# Patient Record
Sex: FEMALE | Race: NATIVE HAWAIIAN OR OTHER PACIFIC ISLANDER | Employment: UNEMPLOYED | URBAN - METROPOLITAN AREA
[De-identification: names, ages, dates, MRNs, and addresses within clinical notes are randomized per-mention and may not be internally consistent; named-entity substitution may affect disease eponyms.]

---

## 2017-12-04 ENCOUNTER — OP HISTORICAL/CONVERTED ENCOUNTER (OUTPATIENT)
Dept: OTHER | Age: 42
End: 2017-12-04

## 2020-07-20 PROBLEM — R06.09 DYSPNEA ON EXERTION: Status: ACTIVE | Noted: 2020-07-20

## 2020-07-20 PROBLEM — M54.50 LOW BACK PAIN: Status: ACTIVE | Noted: 2020-07-20

## 2020-07-20 PROBLEM — F31.9 BIPOLAR DISORDER (HCC): Status: ACTIVE | Noted: 2020-07-20

## 2020-07-20 PROBLEM — M35.00 SJOGREN'S DISEASE (HCC): Status: ACTIVE | Noted: 2020-07-20

## 2020-07-20 PROBLEM — I10 ESSENTIAL HYPERTENSION: Status: ACTIVE | Noted: 2020-07-20

## 2020-07-20 PROBLEM — E66.9 OBESITY: Status: ACTIVE | Noted: 2020-07-20

## 2020-07-20 PROBLEM — J45.909 ASTHMA: Status: ACTIVE | Noted: 2020-07-20

## 2020-07-20 PROBLEM — K44.9 HIATAL HERNIA: Status: ACTIVE | Noted: 2020-07-20

## 2020-07-20 PROBLEM — D80.1 COMMON VARIABLE AGAMMAGLOBULINEMIA (HCC): Status: ACTIVE | Noted: 2020-07-20

## 2020-07-20 PROBLEM — K21.9 GASTROESOPHAGEAL REFLUX DISEASE: Status: ACTIVE | Noted: 2020-07-20

## 2020-07-20 PROBLEM — R60.0 EDEMA OF FOOT: Status: ACTIVE | Noted: 2020-07-20

## 2020-07-20 PROBLEM — E78.5 HYPERLIPIDEMIA: Status: ACTIVE | Noted: 2020-07-20

## 2020-07-20 PROBLEM — D72.829 LEUKOCYTOSIS: Status: ACTIVE | Noted: 2020-07-20

## 2020-07-20 RX ORDER — ALPRAZOLAM 1 MG/1
1 TABLET ORAL
COMMUNITY

## 2020-07-20 RX ORDER — TENOFOVIR ALAFENAMIDE 25 MG/1
25 TABLET ORAL DAILY
COMMUNITY

## 2020-07-20 RX ORDER — METOPROLOL TARTRATE 25 MG/1
25 TABLET, FILM COATED ORAL 2 TIMES DAILY
COMMUNITY
End: 2021-10-12 | Stop reason: SDUPTHER

## 2020-07-20 RX ORDER — GABAPENTIN 300 MG/1
300 CAPSULE ORAL 3 TIMES DAILY
COMMUNITY
End: 2020-08-06 | Stop reason: SDUPTHER

## 2020-07-20 RX ORDER — FUROSEMIDE 40 MG/1
40 TABLET ORAL DAILY
COMMUNITY
End: 2021-10-12 | Stop reason: SDUPTHER

## 2020-07-20 RX ORDER — RANOLAZINE 1000 MG/1
TABLET, EXTENDED RELEASE ORAL 2 TIMES DAILY
COMMUNITY
End: 2021-10-12 | Stop reason: SDUPTHER

## 2020-07-20 RX ORDER — FLUTICASONE PROPIONATE 50 MCG
2 SPRAY, SUSPENSION (ML) NASAL DAILY
COMMUNITY
End: 2021-10-12 | Stop reason: SDUPTHER

## 2020-07-20 RX ORDER — FAMOTIDINE 20 MG/1
20 TABLET, FILM COATED ORAL 2 TIMES DAILY
COMMUNITY
End: 2020-08-17 | Stop reason: SDUPTHER

## 2020-07-20 RX ORDER — RISPERIDONE 0.5 MG/1
0.5 TABLET, FILM COATED ORAL 2 TIMES DAILY
COMMUNITY
End: 2022-04-28

## 2020-07-20 RX ORDER — PANTOPRAZOLE SODIUM 40 MG/1
40 TABLET, DELAYED RELEASE ORAL DAILY
COMMUNITY
End: 2020-08-17 | Stop reason: SDUPTHER

## 2020-07-20 RX ORDER — LAMOTRIGINE 25 MG/1
25 TABLET ORAL 2 TIMES DAILY
COMMUNITY

## 2020-07-20 RX ORDER — FOLIC ACID 1 MG/1
TABLET ORAL DAILY
COMMUNITY
End: 2021-10-12 | Stop reason: SDUPTHER

## 2020-07-20 RX ORDER — TRIAZOLAM 0.25 MG/1
0.25 TABLET ORAL
COMMUNITY

## 2020-07-20 RX ORDER — DEXTROAMPHETAMINE SACCHARATE, AMPHETAMINE ASPARTATE, DEXTROAMPHETAMINE SULFATE AND AMPHETAMINE SULFATE 7.5; 7.5; 7.5; 7.5 MG/1; MG/1; MG/1; MG/1
30 TABLET ORAL DAILY
COMMUNITY

## 2020-07-20 RX ORDER — BACLOFEN 10 MG/1
TABLET ORAL 3 TIMES DAILY
COMMUNITY
End: 2021-10-12 | Stop reason: SDUPTHER

## 2020-07-20 RX ORDER — METHOCARBAMOL 500 MG/1
TABLET, FILM COATED ORAL 4 TIMES DAILY
COMMUNITY
End: 2021-10-12 | Stop reason: SDUPTHER

## 2020-07-20 RX ORDER — HUMAN IMMUNOGLOBULIN G 0.2 G/ML
LIQUID SUBCUTANEOUS
COMMUNITY

## 2020-07-20 RX ORDER — MINERAL OIL
180 ENEMA (ML) RECTAL DAILY
COMMUNITY
End: 2021-10-12 | Stop reason: SDUPTHER

## 2020-07-20 RX ORDER — LANOLIN ALCOHOL/MO/W.PET/CERES
CREAM (GRAM) TOPICAL DAILY
COMMUNITY
End: 2021-10-12 | Stop reason: SDUPTHER

## 2020-07-20 RX ORDER — ALBUTEROL SULFATE 2.5 MG/.5ML
SOLUTION RESPIRATORY (INHALATION) ONCE
COMMUNITY

## 2020-07-20 RX ORDER — METHOTREXATE 2.5 MG/1
10 TABLET ORAL
COMMUNITY

## 2020-07-20 RX ORDER — FLUTICASONE PROPIONATE AND SALMETEROL 500; 50 UG/1; UG/1
1 POWDER RESPIRATORY (INHALATION) EVERY 12 HOURS
COMMUNITY
End: 2021-10-12 | Stop reason: SDUPTHER

## 2020-07-20 RX ORDER — MONTELUKAST SODIUM 10 MG/1
10 TABLET ORAL DAILY
COMMUNITY
End: 2021-10-12 | Stop reason: SDUPTHER

## 2020-07-29 ENCOUNTER — TELEPHONE (OUTPATIENT)
Dept: FAMILY MEDICINE CLINIC | Age: 45
End: 2020-07-29

## 2020-07-29 DIAGNOSIS — M54.50 CHRONIC BILATERAL LOW BACK PAIN WITHOUT SCIATICA: Primary | ICD-10-CM

## 2020-07-29 DIAGNOSIS — G89.29 CHRONIC BILATERAL LOW BACK PAIN WITHOUT SCIATICA: Primary | ICD-10-CM

## 2020-08-06 PROBLEM — G89.29 CHRONIC BILATERAL LOW BACK PAIN WITHOUT SCIATICA: Status: ACTIVE | Noted: 2020-07-20

## 2020-08-06 RX ORDER — GABAPENTIN 300 MG/1
600 CAPSULE ORAL 2 TIMES DAILY
Qty: 120 CAP | Refills: 1 | Status: SHIPPED | OUTPATIENT
Start: 2020-08-06 | End: 2021-01-21

## 2020-08-17 ENCOUNTER — VIRTUAL VISIT (OUTPATIENT)
Dept: INTERNAL MEDICINE CLINIC | Age: 45
End: 2020-08-17
Payer: OTHER GOVERNMENT

## 2020-08-17 DIAGNOSIS — M54.50 CHRONIC BILATERAL LOW BACK PAIN WITHOUT SCIATICA: Primary | ICD-10-CM

## 2020-08-17 DIAGNOSIS — M06.9 RHEUMATOID ARTHRITIS, INVOLVING UNSPECIFIED SITE, UNSPECIFIED RHEUMATOID FACTOR PRESENCE: ICD-10-CM

## 2020-08-17 DIAGNOSIS — G89.29 CHRONIC BILATERAL LOW BACK PAIN WITHOUT SCIATICA: Primary | ICD-10-CM

## 2020-08-17 DIAGNOSIS — K21.9 GASTROESOPHAGEAL REFLUX DISEASE WITHOUT ESOPHAGITIS: ICD-10-CM

## 2020-08-17 DIAGNOSIS — E66.8 MODERATE OBESITY: ICD-10-CM

## 2020-08-17 DIAGNOSIS — I10 ESSENTIAL HYPERTENSION: ICD-10-CM

## 2020-08-17 PROCEDURE — 99443 PR PHYS/QHP TELEPHONE EVALUATION 21-30 MIN: CPT | Performed by: INTERNAL MEDICINE

## 2020-08-17 RX ORDER — HYDROXYCHLOROQUINE SULFATE 200 MG/1
200 TABLET, FILM COATED ORAL DAILY
COMMUNITY
End: 2022-04-28 | Stop reason: SDUPTHER

## 2020-08-17 RX ORDER — PANTOPRAZOLE SODIUM 40 MG/1
40 TABLET, DELAYED RELEASE ORAL DAILY
Qty: 90 TAB | Refills: 3 | Status: SHIPPED | OUTPATIENT
Start: 2020-08-17 | End: 2021-10-12 | Stop reason: SDUPTHER

## 2020-08-17 RX ORDER — FAMOTIDINE 20 MG/1
20 TABLET, FILM COATED ORAL 2 TIMES DAILY
Qty: 90 TAB | Refills: 3 | Status: SHIPPED | OUTPATIENT
Start: 2020-08-17 | End: 2020-09-29

## 2020-08-17 NOTE — PROGRESS NOTES
Yolanda Madison presents today for   Chief Complaint   Patient presents with    Follow-up     referrals [facility]        Depression Screening:  3 most recent PHQ Screens 8/17/2020   Little interest or pleasure in doing things Not at all   Feeling down, depressed, irritable, or hopeless Not at all   Total Score PHQ 2 0       Learning Assessment:  Learning Assessment 8/17/2020   PRIMARY LEARNER Patient   HIGHEST LEVEL OF EDUCATION - PRIMARY LEARNER  GRADUATED HIGH SCHOOL OR GED   PRIMARY LANGUAGE ENGLISH   LEARNER PREFERENCE PRIMARY DEMONSTRATION   ANSWERED BY patient   RELATIONSHIP SELF         Health Maintenance reviewed and discussed and ordered per Provider. Health Maintenance Due   Topic Date Due    Pneumococcal 0-64 years (1 of 3 - PCV13) 08/31/1983    DTaP/Tdap/Td series (1 - Tdap) 08/31/1998    PAP AKA CERVICAL CYTOLOGY  08/31/1998    Lipid Screen  08/31/2017    Influenza Age 9 to Adult  08/01/2020   . Coordination of Care:  1. Have you been to the ER, urgent care clinic since your last visit? Hospitalized since your last visit?no    2. Have you seen or consulted any other health care providers outside of the [address] [place] [address] since your last visit? Include any pap smears or colon screening.  No

## 2020-08-17 NOTE — PROGRESS NOTES
I was at my office in [place], [place] while conducting this encounter. The patient is at home. Consent:  Patient and/or HIS/HER healthcare decision maker is aware that this patient-initiated Telehealth encounter is a billable service, with coverage as determined by patient's insurance carrier. Patient is aware that He/She may receive a bill and has provided verbal consent to proceed: Consent has been obtained within past 12 months of the date of this encounter. This virtual visit was conducted via Telephone    1. Chronic bilateral low back pain without sciatica  The patient's old referral to [facility] has evidently . She does not currently have the identifying information for us to fax a referral over with this woman she gets the information and I told her would be happy to send this referral over    2. Gastroesophageal reflux disease without esophagitis  Renew Protonix and Pepcid  - pantoprazole (PROTONIX) 40 mg tablet; Take 1 Tab by mouth daily. Dispense: 90 Tab; Refill: 3  - famotidine (PEPCID) 20 mg tablet; Take 1 Tab by mouth two (2) times a day. Dispense: 90 Tab; Refill: 3    3. Essential hypertension  Patient had labs ordered back in February and she never had them done. I have informed her that before I order any additional medications I will need her to get labs and will order a metabolic panel  - METABOLIC PANEL, BASIC; Future    4. Rheumatoid arthritis, involving unspecified site, unspecified rheumatoid factor presence ([facility] [address]  She is on chronic methotrexate and I am unsure when her last CBC was. We have not checked this  - CBC WITH AUTOMATED DIFF; Future    5. Moderate obesity  He is at high risk for metabolic syndrome we will check a lipid panel and a hemoglobin A1c  - HEMOGLOBIN A1C WITH EAG; Future  - LIPID PANEL;  Future       Chief Complaint   Patient presents with    Follow-up     referrals [facility]         [facility]   This 17-year-old female with multiple medical problems who presents today for refills on her meds. She was last seen in February of this year by Dr. Monty Hardy who at that point addressed approximately 10 medical issues. She had also ordered labs and referrals. Evidently these were not done. The patient reports she has chronic lower back pain due to her sciatica and has been seeing pain management at [facility] but evidently her referral has . And she is asking for a new referral.  She has no chest pain shortness of breath nausea vomiting or diarrhea and and has been urinating without difficulty. Reports her GERD has been controlled with her current drug regimen. Current Outpatient Medications on File Prior to Visit   Medication Sig Dispense Refill    hydrOXYchloroQUINE (Plaquenil) 200 mg tablet Take 200 mg by mouth daily. 2 tabs daily      gabapentin (NEURONTIN) 300 mg capsule Take 2 Caps by mouth two (2) times a day. Max Daily Amount: 1,200 mg. 120 Cap 1    fluticasone propion-salmeteroL (Advair Diskus) 500-50 mcg/dose diskus inhaler Take 1 Puff by inhalation every twelve (12) hours.  albuterol sulfate (PROVENTIL;VENTOLIN) 2.5 mg/0.5 mL nebu nebulizer solution by Nebulization route once.  ALPRAZolam (XANAX) 1 mg tablet Take 1 mg by mouth. 1 and 1/2 tabs daily as needed      mecobalamin (B12 ACTIVE PO) Take  by mouth.  baclofen (LIORESAL) 10 mg tablet Take  by mouth three (3) times daily.  dextroamphetamine-amphetamine (ADDERALL) 30 mg tablet Take 30 mg by mouth.  fexofenadine (ALLEGRA) 180 mg tablet Take  by mouth.  fluticasone propionate (FLONASE) 50 mcg/actuation nasal spray 2 Sprays by Both Nostrils route daily.  folic acid (FOLVITE) 1 mg tablet Take  by mouth daily.  furosemide (LASIX) 40 mg tablet Take  by mouth daily.  immun glob G,IgG,-pro-IgA 0-50 (Hizentra) 10 gram/50 mL (20 %) soln by SubCUTAneous route.  adalimumab (HUMIRA,CF, SC) by SubCUTAneous route.       lamoTRIgine (LaMICtal) 25 mg tablet Take  by mouth daily.      methocarbamoL (ROBAXIN) 500 mg tablet Take  by mouth four (4) times daily.  methotrexate (RHEUMATREX) 2.5 mg tablet Take  by mouth.  metoprolol tartrate (LOPRESSOR) 25 mg tablet Take  by mouth two (2) times a day.  montelukast (SINGULAIR) 10 mg tablet Take 10 mg by mouth daily.  pyridoxine, vitamin B6, (VITAMIN B-6) 50 mg tablet Take  by mouth daily.  ranolazine ER (RANEXA) 1,000 mg Take  by mouth two (2) times a day.  risperiDONE (RisperDAL) 0.5 mg tablet Take  by mouth.  triazolam (HALCION) 0.25 mg tablet Take 0.25 mg by mouth nightly as needed.  tenofovir ALAFENAMIDE FUMARATE (Vemlidy) 25 mg tab Take  by mouth.  [DISCONTINUED] famotidine (PEPCID) 20 mg tablet Take 20 mg by mouth two (2) times a day.  [DISCONTINUED] pantoprazole (PROTONIX) 40 mg tablet Take 40 mg by mouth daily. No current facility-administered medications on file prior to visit.          Past Medical History:   Diagnosis Date    Asthma 7/20/2020    Bipolar disorder ([facility] [address] 7/20/2020    Common variable agammaglobulinemia ([facility] [address] 7/20/2020    Dyspnea on exertion 7/20/2020    Edema of foot 7/20/2020    Essential hypertension 7/20/2020    Gastroesophageal reflux disease 7/20/2020    Hiatal hernia 7/20/2020    Hyperlipidemia 7/20/2020    Leukocytosis 7/20/2020    Low back pain 7/20/2020    Obesity 7/20/2020    Sjogren's disease ([facility] [address] 7/20/2020             Total Time Spent on this Encounter: greater than 21 minutes

## 2020-09-29 DIAGNOSIS — K21.9 GASTROESOPHAGEAL REFLUX DISEASE WITHOUT ESOPHAGITIS: ICD-10-CM

## 2020-09-29 RX ORDER — FAMOTIDINE 20 MG/1
TABLET, FILM COATED ORAL
Qty: 180 TAB | Refills: 3 | Status: SHIPPED | OUTPATIENT
Start: 2020-09-29 | End: 2021-02-02 | Stop reason: SDUPTHER

## 2020-11-03 ENCOUNTER — VIRTUAL VISIT (OUTPATIENT)
Dept: FAMILY MEDICINE CLINIC | Age: 45
End: 2020-11-03

## 2020-11-03 DIAGNOSIS — Z12.31 SCREENING MAMMOGRAM, ENCOUNTER FOR: ICD-10-CM

## 2020-11-03 DIAGNOSIS — M35.9 CONNECTIVE TISSUE DISEASE (HCC): Primary | ICD-10-CM

## 2020-11-03 DIAGNOSIS — K76.9 LIVER DISORDER: ICD-10-CM

## 2020-11-03 DIAGNOSIS — D80.1 COMMON VARIABLE AGAMMAGLOBULINEMIA (HCC): ICD-10-CM

## 2020-11-03 DIAGNOSIS — M54.16 LUMBAR RADICULOPATHY: ICD-10-CM

## 2020-11-03 DIAGNOSIS — D72.820 LYMPHOCYTOSIS: ICD-10-CM

## 2020-11-03 PROCEDURE — 99442 PR PHYS/QHP TELEPHONE EVALUATION 11-20 MIN: CPT | Performed by: NURSE PRACTITIONER

## 2020-11-03 NOTE — PROGRESS NOTES
Kevin Castellon is a 39 y.o. female, evaluated via audio-only technology on 11/3/2020 for Follow Up Chronic Condition; Anxiety; Depression; Cholesterol Problem; and Hypertension  . Assessment & Plan:   Diagnoses and all orders for this visit:    1. Connective tissue disease ([facility] [address]  -     REFERRAL TO RHEUMATOLOGY    2. Common variable agammaglobulinemia ([facility] [address]  -     REFERRAL TO IMMUNOLOGY    3. Lymphocytosis  -     REFERRAL TO HEMATOLOGY    4. Liver disorder  -     REFERRAL TO GASTROENTEROLOGY    5. Lumbar radiculopathy  -     REFERRAL TO SPINE SURGERY    6. Screening mammogram, encounter for  -     [facility] MAMMO BI SCREENING INCL CAD; Future            12  Subjective:   Patient is here today for multiple referrals and follow-up. Patient has a history of connective tissue diease. She is being followed by Rheumatology at [facility]. She carriers a positive marker for Hepatitis C and is being followed by hepatology and GI. She has a history of lymphocytosis and is being followed by hematology/oncology. Today she reports she is doing well, no new complaints. Denies any shortness of breath or chest pains. Prior to Admission medications    Medication Sig Start Date End Date Taking? Authorizing Provider   famotidine (PEPCID) 20 mg tablet TAKE 1 TO 2 TABLETS AT BEDTIME AS NEEDED FOR ACID REFLUX  (5/28/20 NEED TO SET UP APPOINTMENT WITH A NEW PROVIDER FOR ADDITIONAL REFILLS) 9/29/20  Yes Addison Mancilla MD   hydrOXYchloroQUINE (Plaquenil) 200 mg tablet Take 200 mg by mouth daily. 2 tabs daily   Yes Provider, Historical   pantoprazole (PROTONIX) 40 mg tablet Take 1 Tab by mouth daily. 8/17/20  Yes Gillian Ambrosio MD   gabapentin (NEURONTIN) 300 mg capsule Take 2 Caps by mouth two (2) times a day. Max Daily Amount: 1,200 mg. 8/6/20  Yes Addison Mancilla MD   fluticasone propion-salmeteroL (Advair Diskus) 500-50 mcg/dose diskus inhaler Take 1 Puff by inhalation every twelve (12) hours.    Yes Provider, Historical   albuterol sulfate (PROVENTIL;VENTOLIN) 2.5 mg/0.5 mL nebu nebulizer solution by Nebulization route once. Yes Provider, Historical   ALPRAZolam (XANAX) 1 mg tablet Take 1 mg by mouth. 1 and 1/2 tabs daily as needed   Yes Provider, Historical   mecobalamin (B12 ACTIVE PO) Take  by mouth. Yes Provider, Historical   baclofen (LIORESAL) 10 mg tablet Take  by mouth three (3) times daily. Yes Provider, Historical   dextroamphetamine-amphetamine (ADDERALL) 30 mg tablet Take 30 mg by mouth daily. Yes Provider, Historical   fexofenadine (ALLEGRA) 180 mg tablet Take 180 mg by mouth daily. Yes Provider, Historical   fluticasone propionate (FLONASE) 50 mcg/actuation nasal spray 2 Sprays by Both Nostrils route daily. Yes Provider, Historical   folic acid (FOLVITE) 1 mg tablet Take  by mouth daily. Yes Provider, Historical   furosemide (LASIX) 40 mg tablet Take 40 mg by mouth daily. Yes Provider, Historical   immun glob G,IgG,-pro-IgA 0-50 (Hizentra) 10 gram/50 mL (20 %) soln by SubCUTAneous route. Patient takes 10 grams every Monday   Yes Provider, Historical   adalimumab (HUMIRA,CF, SC) by SubCUTAneous route. Yes Provider, Historical   lamoTRIgine (LaMICtal) 25 mg tablet Take 25 mg by mouth two (2) times a day. Yes Provider, Historical   methocarbamoL (ROBAXIN) 500 mg tablet Take  by mouth four (4) times daily. Yes Provider, Historical   methotrexate (RHEUMATREX) 2.5 mg tablet Take 10 mg by mouth every Sunday. Yes Provider, Historical   montelukast (SINGULAIR) 10 mg tablet Take 10 mg by mouth daily. Yes Provider, Historical   pyridoxine, vitamin B6, (VITAMIN B-6) 50 mg tablet Take  by mouth daily. Yes Provider, Historical   ranolazine ER (RANEXA) 1,000 mg Take  by mouth two (2) times a day. Yes Provider, Historical   risperiDONE (RisperDAL) 0.5 mg tablet Take 0.5 mg by mouth two (2) times a day.    Yes Provider, Historical   triazolam (HALCION) 0.25 mg tablet Take 0.25 mg by mouth nightly as needed. Yes Provider, Historical   tenofovir ALAFENAMIDE FUMARATE (Vemlidy) 25 mg tab Take 25 mg by mouth daily. Yes Provider, Historical   metoprolol tartrate (LOPRESSOR) 25 mg tablet Take 25 mg by mouth two (2) times a day. Patient takes 1 and 1/2 once daily    Provider, Historical         Review of Systems   Constitutional: Negative for chills and fever. Respiratory: Negative for cough and shortness of breath. Cardiovascular: Negative for chest pain, palpitations and leg swelling. Genitourinary: Negative. Musculoskeletal: Positive for myalgias. Skin: Negative for rash. Neurological: Negative for dizziness, weakness and headaches. Psychiatric/Behavioral: Negative for depression. The patient is not nervous/anxious. Physical Exam  Vitals signs and nursing note reviewed. Constitutional:       Comments: Physical exam was deferred due to COVID- 19 precautions as visit was completed via telemedicine. No flowsheet data found. Guicho Mullins, who was evaluated through a patient-initiated, synchronous (real-time) audio only encounter, and/or her healthcare decision maker, is aware that it is a billable service, with coverage as determined by her insurance carrier. She provided verbal consent to proceed: Yes. She has not had a related appointment within my department in the past 7 days or scheduled within the next 24 hours.       Total Time: minutes: 11-20 minutes    David De Oliveira NP

## 2020-11-03 NOTE — PROGRESS NOTES
Mark Common presents today for   Chief Complaint   Patient presents with    Follow Up Chronic Condition    Anxiety    Depression    Cholesterol Problem    Hypertension       Depression Screening:  3 most recent PHQ Screens 11/3/2020   Little interest or pleasure in doing things Not at all   Feeling down, depressed, irritable, or hopeless Several days   Total Score PHQ 2 1       Learning Assessment:  Learning Assessment 11/3/2020   PRIMARY LEARNER Patient   HIGHEST LEVEL OF EDUCATION - PRIMARY LEARNER  SOME COLLEGE   BARRIERS PRIMARY LEARNER NONE   CO-LEARNER CAREGIVER No   PRIMARY LANGUAGE ENGLISH   LEARNER PREFERENCE PRIMARY LISTENING   ANSWERED BY Brenda Yoder   RELATIONSHIP SELF       Abuse Screening:  Abuse Screening Questionnaire 11/3/2020   Do you ever feel afraid of your partner? N   Are you in a relationship with someone who physically or mentally threatens you? N   Is it safe for you to go home? Y       Fall Risk  Fall Risk Assessment, last 12 mths 11/3/2020   Able to walk? Yes   Fall in past 12 months? Yes   Fall with injury? No   Number of falls in past 12 months 4   Fall Risk Score 4       ADL  ADL Assessment 11/3/2020   Feeding yourself No Help Needed   Getting from bed to chair No Help Needed   Getting dressed No Help Needed   Bathing or showering No Help Needed   Walk across the room (includes cane/walker) No Help Needed   Using the telphone No Help Needed   Taking your medications No Help Needed   Preparing meals No Help Needed   Managing money (expenses/bills) No Help Needed   Moderately strenuous housework (laundry) No Help Needed   Shopping for personal items (toiletries/medicines) No Help Needed   Shopping for groceries No Help Needed   Driving No Help Needed   Climbing a flight of stairs No Help Needed   Getting to places beyond walking distances No Help Needed       Health Maintenance reviewed and discussed and ordered per Provider.     Health Maintenance Due   Topic Date Due    PAP AKA CERVICAL CYTOLOGY  08/31/1998    Lipid Screen  08/31/2017    Flu Vaccine (1) 09/01/2020   . Coordination of Care:  1. Have you been to the ER, urgent care clinic since your last visit? Hospitalized since your last visit? No/ No     2. Have you seen or consulted any other health care providers outside of the [address] [place] [address] since your last visit? Include any pap smears or colon screening. No     Pap- Patient had complete hysterectomy  Mammogram - 2019 per patient.

## 2020-11-06 ENCOUNTER — HOSPITAL ENCOUNTER (OUTPATIENT)
Dept: MAMMOGRAPHY | Age: 45
Discharge: HOME | End: 2020-11-06
Attending: NURSE PRACTITIONER
Payer: OTHER GOVERNMENT

## 2020-11-06 DIAGNOSIS — Z12.31 SCREENING MAMMOGRAM, ENCOUNTER FOR: ICD-10-CM

## 2020-11-06 PROCEDURE — 77067 SCR MAMMO BI INCL CAD: CPT

## 2020-12-17 ENCOUNTER — OFFICE VISIT (OUTPATIENT)
Dept: FAMILY MEDICINE CLINIC | Age: 45
End: 2020-12-17
Payer: OTHER GOVERNMENT

## 2020-12-17 VITALS
BODY MASS INDEX: 34.4 KG/M2 | TEMPERATURE: 96.7 F | OXYGEN SATURATION: 97 % | HEART RATE: 93 BPM | HEIGHT: 68 IN | WEIGHT: 227 LBS | DIASTOLIC BLOOD PRESSURE: 97 MMHG | SYSTOLIC BLOOD PRESSURE: 136 MMHG

## 2020-12-17 DIAGNOSIS — F17.200 TOBACCO DEPENDENCE: ICD-10-CM

## 2020-12-17 DIAGNOSIS — S41.111D LACERATION OF RIGHT UPPER EXTREMITY, SUBSEQUENT ENCOUNTER: Primary | ICD-10-CM

## 2020-12-17 PROBLEM — S41.111A LACERATION OF RIGHT UPPER EXTREMITY: Status: ACTIVE | Noted: 2020-12-17

## 2020-12-17 PROCEDURE — 99213 OFFICE O/P EST LOW 20 MIN: CPT | Performed by: FAMILY MEDICINE

## 2020-12-17 NOTE — PROGRESS NOTES
Subjective:   Ramya Ojeda is a 39 y.o. female who was seen for Wound Check    HPI patient is a 51-year-old female. She fell on her back door 2 days ago and sustained a huge laceration to the right forearm laterally. They put 14 stitches in it they were told they put stitches underneath as well. They put a significant compression dressing on. Her last tetanus was 4 years ago. No chest congestion or cough no rash no syncope or loss of consciousness. Her bowel movements have been appropriate. She is a chronic smoker. She has bipolar disease hypertension hyperlipidemia as well. Home Medications    Medication Sig Start Date End Date Taking? Authorizing Provider   famotidine (PEPCID) 20 mg tablet TAKE 1 TO 2 TABLETS AT BEDTIME AS NEEDED FOR ACID REFLUX  (5/28/20 NEED TO SET UP APPOINTMENT WITH A NEW PROVIDER FOR ADDITIONAL REFILLS) 9/29/20   Fatou Osman MD   hydrOXYchloroQUINE (Plaquenil) 200 mg tablet Take 200 mg by mouth daily. 2 tabs daily    Provider, Historical   pantoprazole (PROTONIX) 40 mg tablet Take 1 Tab by mouth daily. 8/17/20   Romeo Lopes MD   gabapentin (NEURONTIN) 300 mg capsule Take 2 Caps by mouth two (2) times a day. Max Daily Amount: 1,200 mg. 8/6/20   Fatou Osman MD   fluticasone propion-salmeteroL (Advair Diskus) 500-50 mcg/dose diskus inhaler Take 1 Puff by inhalation every twelve (12) hours. Provider, Historical   albuterol sulfate (PROVENTIL;VENTOLIN) 2.5 mg/0.5 mL nebu nebulizer solution by Nebulization route once. Provider, Historical   ALPRAZolam (XANAX) 1 mg tablet Take 1 mg by mouth. 1 and 1/2 tabs daily as needed    Provider, Historical   mecobalamin (B12 ACTIVE PO) Take  by mouth. Provider, Historical   baclofen (LIORESAL) 10 mg tablet Take  by mouth three (3) times daily. Provider, Historical   dextroamphetamine-amphetamine (ADDERALL) 30 mg tablet Take 30 mg by mouth daily.     Provider, Historical   fexofenadine (ALLEGRA) 180 mg tablet Take 180 mg by mouth daily. Provider, Historical   fluticasone propionate (FLONASE) 50 mcg/actuation nasal spray 2 Sprays by Both Nostrils route daily. Provider, Historical   folic acid (FOLVITE) 1 mg tablet Take  by mouth daily. Provider, Historical   furosemide (LASIX) 40 mg tablet Take 40 mg by mouth daily. Provider, Historical   immun glob G,IgG,-pro-IgA 0-50 (Hizentra) 10 gram/50 mL (20 %) soln by SubCUTAneous route. Patient takes 10 grams every Monday    Provider, Historical   adalimumab (HUMIRA,CF, SC) by SubCUTAneous route. Provider, Historical   lamoTRIgine (LaMICtal) 25 mg tablet Take 25 mg by mouth two (2) times a day. Provider, Historical   methocarbamoL (ROBAXIN) 500 mg tablet Take  by mouth four (4) times daily. Provider, Historical   methotrexate (RHEUMATREX) 2.5 mg tablet Take 10 mg by mouth every Sunday. Provider, Historical   metoprolol tartrate (LOPRESSOR) 25 mg tablet Take 25 mg by mouth two (2) times a day. Patient takes 1 and 1/2 once daily    Provider, Historical   montelukast (SINGULAIR) 10 mg tablet Take 10 mg by mouth daily. Provider, Historical   pyridoxine, vitamin B6, (VITAMIN B-6) 50 mg tablet Take  by mouth daily. Provider, Historical   ranolazine ER (RANEXA) 1,000 mg Take  by mouth two (2) times a day. Provider, Historical   risperiDONE (RisperDAL) 0.5 mg tablet Take 0.5 mg by mouth two (2) times a day. Provider, Historical   triazolam (HALCION) 0.25 mg tablet Take 0.25 mg by mouth nightly as needed. Provider, Historical   tenofovir ALAFENAMIDE FUMARATE (Vemlidy) 25 mg tab Take 25 mg by mouth daily.     Provider, Historical      Allergies   Allergen Reactions    Topiramate Other (comments)     Irregular heart rate      Tramadol Hives    Codeine Nausea Only    Diclofenac Swelling    Wellbutrin [Bupropion Hcl] Hives     Social History     Tobacco Use    Smoking status: Current Every Day Smoker     Packs/day: 0.25     Years: 15.00     Pack years: 3.75    Smokeless tobacco: Never Used   Substance Use Topics    Alcohol use: Not Currently     Frequency: Never    Drug use: Never            Review of Systems   Constitutional: Negative. HENT: Negative. Eyes: Negative. Respiratory: Negative. Cardiovascular: Negative. Gastrointestinal: Negative. Genitourinary: Negative. Musculoskeletal: Negative. Allergic/Immunologic: Negative. Neurological: Negative. Hematological: Negative. Psychiatric/Behavioral: Negative. Physical Exam   Objective:     Visit Vitals  BP (!) 136/97 (BP 1 Location: Left arm, BP Patient Position: Sitting)   Pulse 93   Temp (!) 96.7 °F (35.9 °C) (Temporal)   Ht 5' 8\" (1.727 m)   Wt 227 lb (103 kg)   SpO2 97%   BMI 34.52 kg/m²      General: alert, cooperative, no distress   Mental  status: normal mood, behavior, speech, dress, motor activity, and thought processes, able to follow commands   HENT: NCAT   Neck: no visualized mass   Resp: no respiratory distress   Neuro: no gross deficits   Skin: no discoloration or lesions of concern on visible areas   Psychiatric: normal affect, consistent with stated mood, no evidence of hallucinations   . Blood pressure is 130/90. She has a 6 or 7 cm laceration of the right forearm laterally it is healing well there is minimal hematoma associated with it there really no clinical signs of infection or redness. She is pleasant and cooperative. No significant distress. The lungs are clear the abdomen is benign she smells of tobacco smoke.     Assessment & Plan:     Laceration forearm, tobacco dependence, hypertension: Is insistent that her blood pressures are appropriate at home she is quite anxious today I have asked her to check her blood pressures daily we had a long discussion about tobacco dependence and use and she has not decided yet to stop she says she has an immune disorder and I have told her smoking probably worsens that I remove the bandage I cleaned the area and looked quite good I do not think we need to see her back I redressed it and told her she could follow-up in about 9 more days and we will take her sutures out she will call us if any other issues arise. Think she needs antibiotics. She is going to check her blood pressure at least 1-2 times a day and write that down. I have asked her to bring her blood pressure cuff with her. We had a significantly long discussion about tobacco dependence and the need for her to discontinue ASAP        712    Additional exam findings: We discussed the expected course, resolution and complications of the diagnosis(es) in detail. Medication risks, benefits, costs, interactions, and alternatives were discussed as indicated. I advised her to contact the office if her condition worsens, changes or fails to improve as anticipated. She expressed understanding with the diagnosis(es) and plan.

## 2020-12-17 NOTE — PROGRESS NOTES
Dee Larson presents today for   Chief Complaint   Patient presents with    Wound Check       Is someone accompanying this pt? no    Is the patient using any DME equipment during OV? no    Depression Screening:  3 most recent PHQ Screens 11/3/2020   Little interest or pleasure in doing things Not at all   Feeling down, depressed, irritable, or hopeless Several days   Total Score PHQ 2 1       Learning Assessment:  Learning Assessment 12/17/2020   PRIMARY LEARNER Patient   HIGHEST LEVEL OF EDUCATION - PRIMARY LEARNER  -   BARRIERS PRIMARY LEARNER -   CO-LEARNER CAREGIVER -   PRIMARY LANGUAGE ENGLISH   LEARNER PREFERENCE PRIMARY DEMONSTRATION   ANSWERED BY patient   RELATIONSHIP SELF       Health Maintenance reviewed and discussed and ordered per Provider. Health Maintenance Due   Topic Date Due    Pneumococcal 0-64 years (1 of 3 - PCV13) 08/31/1983    PAP AKA CERVICAL CYTOLOGY  08/31/1998    Lipid Screen  08/31/2017   . Coordination of Care:  1. Have you been to the ER, urgent care clinic since your last visit? Hospitalized since your last visit? Yes vidant for a fall and wound to right arm    2. Have you seen or consulted any other health care providers outside of the [address] [place] [address] since your last visit? Include any pap smears or colon screening.  Yes hospital in   [place], [place]    Providers orders his own labs, orders for colonoscopy, mammograms and referrals as needed    Last UDS Checked no  Last Pain contract signed: no

## 2020-12-28 ENCOUNTER — OFFICE VISIT (OUTPATIENT)
Dept: FAMILY MEDICINE CLINIC | Age: 45
End: 2020-12-28
Payer: OTHER GOVERNMENT

## 2020-12-28 VITALS
BODY MASS INDEX: 33.04 KG/M2 | SYSTOLIC BLOOD PRESSURE: 125 MMHG | TEMPERATURE: 97.8 F | HEART RATE: 83 BPM | HEIGHT: 68 IN | DIASTOLIC BLOOD PRESSURE: 86 MMHG | WEIGHT: 218 LBS | OXYGEN SATURATION: 97 %

## 2020-12-28 DIAGNOSIS — F17.200 TOBACCO DEPENDENCE: ICD-10-CM

## 2020-12-28 DIAGNOSIS — S41.111D LACERATION OF RIGHT UPPER EXTREMITY, SUBSEQUENT ENCOUNTER: ICD-10-CM

## 2020-12-28 DIAGNOSIS — J45.20 MILD INTERMITTENT ASTHMA, UNSPECIFIED WHETHER COMPLICATED: Primary | ICD-10-CM

## 2020-12-28 PROCEDURE — 99213 OFFICE O/P EST LOW 20 MIN: CPT | Performed by: FAMILY MEDICINE

## 2020-12-28 NOTE — PROGRESS NOTES
Addison Maddox presents today for   Chief Complaint   Patient presents with    Follow-up       Is someone accompanying this pt? no    Is the patient using any DME equipment during OV? no    Depression Screening:  3 most recent PHQ Screens 11/3/2020   Little interest or pleasure in doing things Not at all   Feeling down, depressed, irritable, or hopeless Several days   Total Score PHQ 2 1       Learning Assessment:  Learning Assessment 12/17/2020   PRIMARY LEARNER Patient   HIGHEST LEVEL OF EDUCATION - PRIMARY LEARNER  -   BARRIERS PRIMARY LEARNER -   CO-LEARNER CAREGIVER -   PRIMARY LANGUAGE ENGLISH   LEARNER PREFERENCE PRIMARY DEMONSTRATION   ANSWERED BY patient   RELATIONSHIP SELF       Health Maintenance reviewed and discussed and ordered per Provider. Health Maintenance Due   Topic Date Due    Pneumococcal 0-64 years (1 of 3 - PCV13) 08/31/1983    PAP AKA CERVICAL CYTOLOGY  08/31/1998    Lipid Screen  08/31/2017   . Coordination of Care:  1. Have you been to the ER, urgent care clinic since your last visit? Hospitalized since your last visit? no    2. Have you seen or consulted any other health care providers outside of the [address] [place] [address] since your last visit? Include any pap smears or colon screening.  no    Providers orders his own labs, orders for colonoscopy, mammograms and referrals as needed    Last UDS Checked no  Last Pain contract signed: no

## 2020-12-28 NOTE — PROGRESS NOTES
Subjective:   Yashira Santiago is a 39 y.o. female who was seen for Follow-up    HPI the patient is a 79-year-old female who had a significant wound to the right arm from a fall she was on antibiotic therapy and we asked to follow the wound up today. She is still smoking. She has a multitude of medical problems she has bipolar disease. She has obesity she has a history of asthma in spite of smoking she was having some congestion her last visit I felt that we need to follow that up as well we did not give her any steroids. She does use an inhaler and we had that refilled. No rash no syncope or loss of consciousness. Bowel movements have been appropriate nobody at home is been sick thinks the wound is looking better at feeling a little itchy around the edges    Home Medications    Medication Sig Start Date End Date Taking? Authorizing Provider   famotidine (PEPCID) 20 mg tablet TAKE 1 TO 2 TABLETS AT BEDTIME AS NEEDED FOR ACID REFLUX  (5/28/20 NEED TO SET UP APPOINTMENT WITH A NEW PROVIDER FOR ADDITIONAL REFILLS) 9/29/20  Yes Wilfredo Felix MD   hydrOXYchloroQUINE (Plaquenil) 200 mg tablet Take 200 mg by mouth daily. 2 tabs daily   Yes Provider, Historical   pantoprazole (PROTONIX) 40 mg tablet Take 1 Tab by mouth daily. 8/17/20  Yes María Mckinnon MD   gabapentin (NEURONTIN) 300 mg capsule Take 2 Caps by mouth two (2) times a day. Max Daily Amount: 1,200 mg. 8/6/20  Yes Wilfredo Felix MD   fluticasone propion-salmeteroL (Advair Diskus) 500-50 mcg/dose diskus inhaler Take 1 Puff by inhalation every twelve (12) hours. Yes Provider, Historical   mecobalamin (B12 ACTIVE PO) Take  by mouth. Yes Provider, Historical   fexofenadine (ALLEGRA) 180 mg tablet Take 180 mg by mouth daily. Yes Provider, Historical   fluticasone propionate (FLONASE) 50 mcg/actuation nasal spray 2 Sprays by Both Nostrils route daily. Yes Provider, Historical   folic acid (FOLVITE) 1 mg tablet Take  by mouth daily.    Yes Provider, Historical   furosemide (LASIX) 40 mg tablet Take 40 mg by mouth daily. Yes Provider, Historical   immun glob G,IgG,-pro-IgA 0-50 (Hizentra) 10 gram/50 mL (20 %) soln by SubCUTAneous route. Patient takes 10 grams every Monday   Yes Provider, Historical   lamoTRIgine (LaMICtal) 25 mg tablet Take 25 mg by mouth two (2) times a day. Yes Provider, Historical   methocarbamoL (ROBAXIN) 500 mg tablet Take  by mouth four (4) times daily. Yes Provider, Historical   methotrexate (RHEUMATREX) 2.5 mg tablet Take 10 mg by mouth every Sunday. Yes Provider, Historical   metoprolol tartrate (LOPRESSOR) 25 mg tablet Take 25 mg by mouth two (2) times a day. Patient takes 1 and 1/2 once daily   Yes Provider, Historical   montelukast (SINGULAIR) 10 mg tablet Take 10 mg by mouth daily. Yes Provider, Historical   pyridoxine, vitamin B6, (VITAMIN B-6) 50 mg tablet Take  by mouth daily. Yes Provider, Historical   ranolazine ER (RANEXA) 1,000 mg Take  by mouth two (2) times a day. Yes Provider, Historical   risperiDONE (RisperDAL) 0.5 mg tablet Take 0.5 mg by mouth two (2) times a day. Yes Provider, Historical   triazolam (HALCION) 0.25 mg tablet Take 0.25 mg by mouth nightly as needed. Yes Provider, Historical   tenofovir ALAFENAMIDE FUMARATE (Vemlidy) 25 mg tab Take 25 mg by mouth daily. Yes Provider, Historical   albuterol sulfate (PROVENTIL;VENTOLIN) 2.5 mg/0.5 mL nebu nebulizer solution by Nebulization route once. Provider, Historical   ALPRAZolam (XANAX) 1 mg tablet Take 1 mg by mouth. 1 and 1/2 tabs daily as needed    Provider, Historical   baclofen (LIORESAL) 10 mg tablet Take  by mouth three (3) times daily. Provider, Historical   dextroamphetamine-amphetamine (ADDERALL) 30 mg tablet Take 30 mg by mouth daily. Provider, Historical   adalimumab (HUMIRA,CF, SC) by SubCUTAneous route.     Provider, Historical      Allergies   Allergen Reactions    Topiramate Other (comments)     Irregular heart rate      Tramadol Hives    Codeine Nausea Only    Diclofenac Swelling    Wellbutrin [Bupropion Hcl] Hives     Social History     Tobacco Use    Smoking status: Current Every Day Smoker     Packs/day: 0.25     Years: 15.00     Pack years: 3.75    Smokeless tobacco: Never Used   Substance Use Topics    Alcohol use: Not Currently     Frequency: Never    Drug use: Never        Patient Active Problem List   Diagnosis Code    Asthma J45.909    [facility] F31.9    Obesity E66.9    Common variable 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 HENT: Negative. Eyes: Negative. Respiratory: Negative. Gastrointestinal: Negative. Endocrine: Negative. Genitourinary: Negative. Allergic/Immunologic: Negative. Neurological: Negative. Hematological: Negative. Psychiatric/Behavioral: Negative.          Physical Exam   Objective:     Visit Vitals  /86 (BP 1 Location: Left arm, BP Patient Position: Sitting)   Pulse 83   Temp 97.8 °F (36.6 °C)   Ht 5' 8\" (1.727 m)   Wt 218 lb (98.9 kg)   SpO2 97%   BMI 33.15 kg/m²      General: alert, cooperative, no distress   Mental  status: normal mood, behavior, speech, dress, motor activity, and thought processes, able to follow commands   HENT: NCAT   Neck: no visualized mass   Resp: no respiratory distress   Neuro: no gross deficits   Skin: no discoloration or lesions of concern on visible areas   Psychiatric: normal affect, consistent with stated mood, no evidence of hallucinations   Laceration on her arm seems relatively well healed. The lungs are clear no asthma no wheezing is appreciated. She is oriented x3 she smells of tobacco products. Assessment & Plan:     Asthma tobacco dependence laceration forearm: The sutures were removed. There were no complications Steri-Strips were applied I again asked her to stop all smoking products she again told me she would not be doing that anytime soon she is going to complete the antibiotic therapy. She will follow up on an as-needed basis. She is followed up by psychiatry for bipolar disease and from pulmonary for her asthma. I spent at least 23 minutes on this visit with this established patient. [phone]    Additional exam findings: We discussed the expected course, resolution and complications of the diagnosis(es) in detail. Medication risks, benefits, costs, interactions, and alternatives were discussed as indicated. I advised her to contact the office if her condition worsens, changes or fails to improve as anticipated. She expressed understanding with the diagnosis(es) and plan.

## 2021-01-21 DIAGNOSIS — G89.29 CHRONIC BILATERAL LOW BACK PAIN WITHOUT SCIATICA: ICD-10-CM

## 2021-01-21 DIAGNOSIS — M54.50 CHRONIC BILATERAL LOW BACK PAIN WITHOUT SCIATICA: ICD-10-CM

## 2021-01-21 RX ORDER — GABAPENTIN 300 MG/1
CAPSULE ORAL
Qty: 120 CAP | Refills: 1 | Status: SHIPPED | OUTPATIENT
Start: 2021-01-21 | End: 2021-03-28

## 2021-02-02 ENCOUNTER — VIRTUAL VISIT (OUTPATIENT)
Dept: FAMILY MEDICINE CLINIC | Age: 46
End: 2021-02-02
Payer: OTHER GOVERNMENT

## 2021-02-02 DIAGNOSIS — R07.89 ATYPICAL CHEST PAIN: ICD-10-CM

## 2021-02-02 DIAGNOSIS — K21.9 GASTROESOPHAGEAL REFLUX DISEASE WITHOUT ESOPHAGITIS: ICD-10-CM

## 2021-02-02 DIAGNOSIS — E78.2 MIXED HYPERLIPIDEMIA: ICD-10-CM

## 2021-02-02 DIAGNOSIS — E78.2 MIXED HYPERLIPIDEMIA: Primary | ICD-10-CM

## 2021-02-02 PROCEDURE — 99442 PR PHYS/QHP TELEPHONE EVALUATION 11-20 MIN: CPT | Performed by: NURSE PRACTITIONER

## 2021-02-02 RX ORDER — FAMOTIDINE 20 MG/1
TABLET, FILM COATED ORAL
Qty: 180 TAB | Refills: 1 | Status: SHIPPED | OUTPATIENT
Start: 2021-02-02 | End: 2021-07-16 | Stop reason: SDUPTHER

## 2021-02-02 NOTE — PROGRESS NOTES
Karin Canavan is a 39 y.o. female, evaluated via audio-only technology on 2/2/2021 for Follow Up Chronic Condition (3 month f/u )  . Assessment & Plan:   Diagnoses and all orders for this visit:    1. Mixed hyperlipidemia  -Checking status  -     LIPID PANEL; Future    2. Gastroesophageal reflux disease without esophagitis  -     famotidine (PEPCID) 20 mg tablet; TAKE 1 TO 2 TABLETS AT BEDTIME AS NEEDED FOR ACID REFLUX  Indications: gastroesophageal reflux disease    3. Atypical chest pain  -     REFERRAL TO CARDIOLOGY            12  Subjective:   Patient is here today for request of referral renewals. Patient is being followed by cardiology and needs a new referral for atypical chest pains, denies having pain at this time. She request a refill of famotidine 20 mg BID, symptoms are controlled with medication use. She has no complaints today. Denies any shortness of breath or chest pains. No lightheadedness or dizziness. Prior to Admission medications    Medication Sig Start Date End Date Taking? Authorizing Provider   gabapentin (NEURONTIN) 300 mg capsule TAKE 2 CAPSULES TWICE A DAY. MAX DAILY AMOUNT OF 1200 MG 1/21/21  Yes Hollie Perkins MD   famotidine (PEPCID) 20 mg tablet TAKE 1 TO 2 TABLETS AT BEDTIME AS NEEDED FOR ACID REFLUX  (5/28/20 NEED TO SET UP APPOINTMENT WITH A NEW PROVIDER FOR ADDITIONAL REFILLS) 9/29/20  Yes Hollie Perkins MD   hydrOXYchloroQUINE (Plaquenil) 200 mg tablet Take 200 mg by mouth daily. 2 tabs daily   Yes Provider, Historical   pantoprazole (PROTONIX) 40 mg tablet Take 1 Tab by mouth daily. 8/17/20  Yes Rene Boyd MD   fluticasone propion-salmeteroL (Advair Diskus) 500-50 mcg/dose diskus inhaler Take 1 Puff by inhalation every twelve (12) hours. Yes Provider, Historical   albuterol sulfate (PROVENTIL;VENTOLIN) 2.5 mg/0.5 mL nebu nebulizer solution by Nebulization route once. Yes Provider, Historical   ALPRAZolam (XANAX) 1 mg tablet Take 1 mg by mouth.  1 and 1/2 tabs daily as needed   Yes Provider, Historical   mecobalamin (B12 ACTIVE PO) Take  by mouth. Yes Provider, Historical   baclofen (LIORESAL) 10 mg tablet Take  by mouth three (3) times daily. Yes Provider, Historical   dextroamphetamine-amphetamine (ADDERALL) 30 mg tablet Take 30 mg by mouth daily. Yes Provider, Historical   fexofenadine (ALLEGRA) 180 mg tablet Take 180 mg by mouth daily. Yes Provider, Historical   fluticasone propionate (FLONASE) 50 mcg/actuation nasal spray 2 Sprays by Both Nostrils route daily. Yes Provider, Historical   folic acid (FOLVITE) 1 mg tablet Take  by mouth daily. Yes Provider, Historical   furosemide (LASIX) 40 mg tablet Take 40 mg by mouth daily. Yes Provider, Historical   immun glob G,IgG,-pro-IgA 0-50 (Hizentra) 10 gram/50 mL (20 %) soln by SubCUTAneous route. Patient takes 10 grams every Monday   Yes Provider, Historical   adalimumab (HUMIRA,CF, SC) by SubCUTAneous route. Yes Provider, Historical   lamoTRIgine (LaMICtal) 25 mg tablet Take 25 mg by mouth two (2) times a day. Yes Provider, Historical   methocarbamoL (ROBAXIN) 500 mg tablet Take  by mouth four (4) times daily. Yes Provider, Historical   methotrexate (RHEUMATREX) 2.5 mg tablet Take 10 mg by mouth every Sunday. Yes Provider, Historical   metoprolol tartrate (LOPRESSOR) 25 mg tablet Take 25 mg by mouth two (2) times a day. Patient takes 1 and 1/2 once daily   Yes Provider, Historical   montelukast (SINGULAIR) 10 mg tablet Take 10 mg by mouth daily. Yes Provider, Historical   pyridoxine, vitamin B6, (VITAMIN B-6) 50 mg tablet Take  by mouth daily. Yes Provider, Historical   ranolazine ER (RANEXA) 1,000 mg Take  by mouth two (2) times a day. Yes Provider, Historical   risperiDONE (RisperDAL) 0.5 mg tablet Take 0.5 mg by mouth two (2) times a day. Yes Provider, Historical   triazolam (HALCION) 0.25 mg tablet Take 0.25 mg by mouth nightly as needed.    Yes Provider, Historical   tenofovir ALAFENAMIDE FUMARATE (Vemlidy) 25 mg tab Take 25 mg by mouth daily. Yes Provider, Historical         Review of Systems   Constitutional: Negative for chills and fever. Respiratory: Negative for cough and shortness of breath. Cardiovascular: Negative for chest pain, palpitations and leg swelling. Genitourinary: Negative. Musculoskeletal: Positive for myalgias. Skin: Negative for rash. Neurological: Negative for dizziness, weakness and headaches. Psychiatric/Behavioral: Negative for depression. The patient is not nervous/anxious. Physical Exam  Vitals signs and nursing note reviewed. Constitutional:       Comments: Physical exam was deferred due to COVID- 19 precautions as visit was completed via telemedicine. No flowsheet data found. Anabela Childress, who was evaluated through a patient-initiated, synchronous (real-time) audio only encounter, and/or her healthcare decision maker, is aware that it is a billable service, with coverage as determined by her insurance carrier. She provided verbal consent to proceed: Yes. She has not had a related appointment within my department in the past 7 days or scheduled within the next 24 hours.       Total Time: minutes: 11-20 minutes    Lazara Alford NP

## 2021-02-02 NOTE — PROGRESS NOTES
Jack Lyons presents today for   Chief Complaint   Patient presents with    Follow Up Chronic Condition     3 month f/u        Depression Screening:  3 most recent PHQ Screens 2/2/2021   Little interest or pleasure in doing things Not at all   Feeling down, depressed, irritable, or hopeless Not at all   Total Score PHQ 2 0       Learning Assessment:  Learning Assessment 12/17/2020   PRIMARY LEARNER Patient   HIGHEST LEVEL OF EDUCATION - PRIMARY LEARNER  -   BARRIERS PRIMARY LEARNER -   CO-LEARNER CAREGIVER -   PRIMARY LANGUAGE ENGLISH   LEARNER PREFERENCE PRIMARY DEMONSTRATION   ANSWERED BY patient   RELATIONSHIP SELF       Abuse Screening:  Abuse Screening Questionnaire 11/3/2020   Do you ever feel afraid of your partner? N   Are you in a relationship with someone who physically or mentally threatens you? N   Is it safe for you to go home? Y       Fall Risk  Fall Risk Assessment, last 12 mths 11/3/2020   Able to walk? Yes   Fall in past 12 months? Yes   Number of falls in past 12 months 4   Fall with injury? 0       ADL  ADL Assessment 11/3/2020   Feeding yourself No Help Needed   Getting from bed to chair No Help Needed   Getting dressed No Help Needed   Bathing or showering No Help Needed   Walk across the room (includes cane/walker) No Help Needed   Using the telphone No Help Needed   Taking your medications No Help Needed   Preparing meals No Help Needed   Managing money (expenses/bills) No Help Needed   Moderately strenuous housework (laundry) No Help Needed   Shopping for personal items (toiletries/medicines) No Help Needed   Shopping for groceries No Help Needed   Driving No Help Needed   Climbing a flight of stairs No Help Needed   Getting to places beyond walking distances No Help Needed       Health Maintenance reviewed and discussed and ordered per Provider.     Health Maintenance Due   Topic Date Due    Pneumococcal 0-64 years (1 of 3 - PCV13) 08/31/1983    PAP AKA CERVICAL CYTOLOGY  08/31/1998  Lipid Screen  08/31/2017   . Coordination of Care:  1. Have you been to the ER, urgent care clinic since your last visit? Hospitalized since your last visit? Italia LEAVITT, Christy- patient fell down stairs x December 2020    2. Have you seen or consulted any other health care providers outside of the [address] [place] [address] [place] since your last visit? Include any pap smears or colon screening.  [facility]- spine specialist x January 2021

## 2021-03-02 ENCOUNTER — OFFICE VISIT (OUTPATIENT)
Dept: SURGERY | Age: 46
End: 2021-03-02
Payer: OTHER GOVERNMENT

## 2021-03-02 VITALS
TEMPERATURE: 98 F | OXYGEN SATURATION: 100 % | HEART RATE: 74 BPM | RESPIRATION RATE: 18 BRPM | DIASTOLIC BLOOD PRESSURE: 80 MMHG | BODY MASS INDEX: 36.26 KG/M2 | SYSTOLIC BLOOD PRESSURE: 126 MMHG | HEIGHT: 67 IN | WEIGHT: 231 LBS

## 2021-03-02 DIAGNOSIS — E66.01 MORBID OBESITY (HCC): Primary | ICD-10-CM

## 2021-03-02 PROCEDURE — 99205 OFFICE O/P NEW HI 60 MIN: CPT | Performed by: SURGERY

## 2021-03-02 RX ORDER — MELOXICAM 7.5 MG/1
7.5 TABLET ORAL DAILY
COMMUNITY
End: 2022-04-28

## 2021-03-02 NOTE — PROGRESS NOTES
Consult    Patient: Fatou Valdez MRN: [id number]  SSN: [id number]    YOB: 1975  Age: 39 y.o. Sex: female      Initial  Consultation for Bariatric Surgery     Brenda Bonner is a 72-year-old white female who presents for discussion surgical options available for definitive management of her clinically severe obesity. Onset obesity: Age 41 weight 190 pounds and 5 feet 7 inches frame  Weight at age 27: 112 pounds on a 5 feet 7 inches frame  Maximum weight: 240 pounds occurring in January 2021  Pattern/progression of weight gain: Slowly progressive interrupted by dietary weight loss followed by regain of the lost weight as well as additional weight thus exhibiting the yoyo effect after max weight of 240 pounds occurring in January 2021 Max medical weight loss attempts: Multiple supervised and and supervised weight loss trials of maximal loss occurring in 2020 losing 15 pounds over 3 months  Comorbidities: Hypertension, untreated hypercholesterolemia, gastroesophageal reflux disease, reactive airway disease requiring steroids approximately 4 times yearly, clinical obstructive sleep apnea, venous stasis, related arthropathyhips, knees  Current weight 231 body mass index: 36  Ideal body weight: 40  Excess body weight: 91  Postsurgical weight loss: 73  Postsurgical goal weight: 158  Allergies: Topiramate, tramadol, codeine, diclofenac, Wellbutrin  Current medications: See medication list  Past medical history:  1. Clinically severe obesity with body mass index of 36 with obesity and comorbidities hypertension, untreated hypercholesterolemia, Gastrosoft reflux disease, reactive airway disease requiring steroids approximately 4 times yearly, clinical struct of sleep apnea, venous stasis, and weight related arthropathyhips, knees  2 history of nephrolithiasis  3. Lupus  4. Fibromyalgia  5. Bipolar disease  6. Allergic rhinitis  7. Anxiety/depression  8. ADHD  9. Sjogren's syndrome  10. Rheumatoid arthritis  11. Questionable Prinzmetal angina  12. Common variable immunodeficiency  Past surgical history  1. Tonsil/adenoidectomychildhood  2. Bilateral tympanostomieschildhood  3. Placement of ureteral stents after transcystic retrieval of nephrolithiasis  4. ALBERTA/BSO, appendectomy   Social history  Tobacco1 pack daily x15 years  Alcohol: None  Family history: Mother 76stage IV breast carcinoma  Father  56coronary disease  Sister 50 if clinically severe obesity, adult-onset diabetes mellitus    Allergies   Allergen Reactions    Topiramate Other (comments)     Irregular heart rate      Tramadol Hives    Codeine Nausea Only    Diclofenac Swelling    Wellbutrin [Bupropion Hcl] Hives       Current Outpatient Medications on File Prior to Visit   Medication Sig Dispense Refill    meloxicam (MOBIC) 7.5 mg tablet Take 7.5 mg by mouth daily. 1-2 tabs daily as needed foor pain      famotidine (PEPCID) 20 mg tablet TAKE 1 TO 2 TABLETS AT BEDTIME AS NEEDED FOR ACID REFLUX  Indications: gastroesophageal reflux disease 180 Tab 1    gabapentin (NEURONTIN) 300 mg capsule TAKE 2 CAPSULES TWICE A DAY. MAX DAILY AMOUNT OF 1200  Cap 1    hydrOXYchloroQUINE (Plaquenil) 200 mg tablet Take 200 mg by mouth daily. 2 tabs daily      pantoprazole (PROTONIX) 40 mg tablet Take 1 Tab by mouth daily. 90 Tab 3    fluticasone propion-salmeteroL (Advair Diskus) 500-50 mcg/dose diskus inhaler Take 1 Puff by inhalation every twelve (12) hours.  albuterol sulfate (PROVENTIL;VENTOLIN) 2.5 mg/0.5 mL nebu nebulizer solution by Nebulization route once.  ALPRAZolam (XANAX) 1 mg tablet Take 1 mg by mouth. 1 and 1/2 tabs daily as needed      mecobalamin (B12 ACTIVE PO) Take  by mouth.  baclofen (LIORESAL) 10 mg tablet Take  by mouth three (3) times daily.  dextroamphetamine-amphetamine (ADDERALL) 30 mg tablet Take 30 mg by mouth daily.       fexofenadine (ALLEGRA) 180 mg tablet Take 180 mg by mouth daily.  fluticasone propionate (FLONASE) 50 mcg/actuation nasal spray 2 Sprays by Both Nostrils route daily.  folic acid (FOLVITE) 1 mg tablet Take  by mouth daily.  furosemide (LASIX) 40 mg tablet Take 40 mg by mouth daily.  immun glob G,IgG,-pro-IgA 0-50 (Hizentra) 10 gram/50 mL (20 %) soln by SubCUTAneous route. Patient takes 10 grams every Monday      adalimumab (HUMIRA,CF, SC) by SubCUTAneous route.  lamoTRIgine (LaMICtal) 25 mg tablet Take 25 mg by mouth two (2) times a day.  methocarbamoL (ROBAXIN) 500 mg tablet Take  by mouth four (4) times daily.  methotrexate (RHEUMATREX) 2.5 mg tablet Take 10 mg by mouth every Sunday.  metoprolol tartrate (LOPRESSOR) 25 mg tablet Take 25 mg by mouth two (2) times a day. Patient takes 1 and 1/2 once daily      montelukast (SINGULAIR) 10 mg tablet Take 10 mg by mouth daily.  pyridoxine, vitamin B6, (VITAMIN B-6) 50 mg tablet Take  by mouth daily.  ranolazine ER (RANEXA) 1,000 mg Take  by mouth two (2) times a day.  risperiDONE (RisperDAL) 0.5 mg tablet Take 0.5 mg by mouth two (2) times a day.  triazolam (HALCION) 0.25 mg tablet Take 0.25 mg by mouth nightly as needed.  tenofovir ALAFENAMIDE FUMARATE (Vemlidy) 25 mg tab Take 25 mg by mouth daily. No current facility-administered medications on file prior to visit.         Past Medical History:   Diagnosis Date    Asthma 7/20/2020    Bipolar disorder ([facility] 7/20/2020    Common variable agammaglobulinemia ([facility] 7/20/2020    Connective tissue disease ([facility]     CVID (common variable immunodeficiency) (HCC)     Dyspnea on exertion 7/20/2020    Edema of foot 7/20/2020    Endocrine disorder     Essential hypertension 7/20/2020    Gastroesophageal reflux disease 7/20/2020    GERD (gastroesophageal reflux disease)     Hiatal hernia 7/20/2020    History of hormone replacement therapy     took it off and on from 2004 till around 2015  Hyperlipidemia 7/20/2020    Leukocytosis 7/20/2020    Low back pain 7/20/2020    Obesity 7/20/2020    Sjogren's disease ([address] 7/20/2020    RA       Past Surgical History:   Procedure Laterality Date    HX APPENDECTOMY      HX HYSTERECTOMY  2004    complete    HX OOPHORECTOMY      HX TONSIL AND ADENOIDECTOMY         Social History     Tobacco Use    Smoking status: Current Every Day Smoker     Packs/day: 0.50     Years: 15.00     Pack years: 7.50    Smokeless tobacco: Never Used   Substance Use Topics    Alcohol use: Not Currently     Frequency: Never    Drug use: Never       Family History   Problem Relation Age of Onset    Hypertension Mother     Thyroid Disease Mother     Heart Disease Mother     Cancer Mother         breast cancer    Breast Cancer Mother     Heart Disease Father         13 heart attacks    Hypertension Father     Breast Cancer Maternal Grandmother     Breast Cancer Paternal Grandmother     Breast Cancer Paternal Aunt          Review of Systems:      General: Denies fevers, chills, night sweats, fatigue, weight loss, or weight gain.     HEENT: Denies changes in auditory or visual acuity, recurrent pharyngitis, epistaxis, chronic rhinorrhea, vertigo    Respiratory: Denies increasing shortness of breath, productive cough, hemoptysis    Cardiac: Denies known history of cardiac disease, heart murmur, palpitations    GI: Denies dysphagia, recurrent emesis, hematemesis, changes in bowel habits, hematochezia, melena    : Denies hematuria frequency urgency dysuria    Musculoskeletal: Denies fractures, dislocations    Neurologic: Denies history of CVA, paralysis paresthesias, recurrent cephalgia, seizures    Endocrine: Denies polyuria, polydipsia, polyphagia, heat and cold intolerance    Lymph/heme: Denies a history of malignancy, anemia, bruising, blood transfusions    Integumentary: Negative for dermatitis         Physical Exam    Visit Vitals  /80   Pulse 74   Temp 98 °F (36.7 °C)   Resp 18   Ht 5' 7\" (1.702 m)   Wt 104.8 kg (231 lb)   SpO2 100%   BMI 36.18 kg/m²       Nursing note reviewed. General: Clinically severely obese in no acute distress, nontoxic in appearance. Head: Normocephalic, atraumatic  Mouth: Clear, no overt lesions, oral mucosa is pink and moist.  Neck: Supple, no masses, no adenopathy or carotid bruits, trachea midline  Resp: Clear to auscultation bilaterally, no wheezing, rhonchi, or rales, excursions normal and symmetrical.  Cardio: Regular rate and rhythm, no murmurs, clicks, gallops, or rubs. Abdomen: Obese, soft, nontender, nondistended, normoactive bowel sounds, no hernias. Extremities: Warm, well perfused, no tenderness or swelling, normal gait/station, without edema or varicosities  Neuro: Sensation and strength grossly intact and symmetrical.  Psych: Alert and oriented to person, place, and time. Impression/Plan:       42-year-old white female with body mass index of 36 with obesity related comorbidities of hypertension, untreated hypercholesterolemia, Gastrosoft reflux disease, reactive airway disease requiring steroids approximately 4 times yearly, clinical obstructive sleep apnea, venous stasis, levator arthropathyhips, knees who would benefit from bariatric surgery. We have had an extensive discussion with regard to the risks, benefits and likely outcomes of the operation. We've discussed the restrictive and malabsorptive nature of the gastric bypass and compared and contrasted with the sleeve gastrectomy. The patient understands the likelihood of losing approximately 80% of their excess weight in 12 to 18 months. The patient also understands the risks including but not limited to bleeding, infection, need for reoperation, ulcers, leaks and strictures, bowel obstruction secondary to adhesions and internal hernias, DVT, PE, heart attack, stroke, and death.  Patient also understands risks of inadequate weight loss, excess weight loss, vitamin insufficiency, protein malnutrition, excess skin, and loss of hair. We have reviewed the components of a successful postoperative course including requirement for a high protein, low carbohydrate diet, 60 oz a day of zero calorie liquids, daily vitamin supplementation, daily exercise, regular follow-up, and participation in support groups.  At this time we will enroll the patient in our bariatric program, undertake routine laboratory evaluation, chest X-ray, EKG, possible UGI and evaluation by  nutritionist as well as psychologist and pending their satisfactory completion of the preop evaluation, plan to pursue laparoscopic potentially open sleeve gastrectomy as her best choice for bariatric surgery given her need for demonstrate dilatation for asthma treatment to achieve definitive durable weight loss on a personal level with expected resolution of obesity related comorbidities

## 2021-03-17 ENCOUNTER — DOCUMENTATION ONLY (OUTPATIENT)
Dept: BARIATRICS/WEIGHT MGMT | Age: 46
End: 2021-03-17

## 2021-03-17 NOTE — PROGRESS NOTES
3/17/21:  Patient did not show for her nutrition class. This is class 1 of 4. She did not complete any of the nutrition requirements leading up to the appointment. She was left a voicemail to reschedule.     Sruthi Hilario MS RD

## 2021-03-27 DIAGNOSIS — G89.29 CHRONIC BILATERAL LOW BACK PAIN WITHOUT SCIATICA: ICD-10-CM

## 2021-03-27 DIAGNOSIS — M51.9 LUMBAR DISC DISEASE: Primary | ICD-10-CM

## 2021-03-27 DIAGNOSIS — M54.50 CHRONIC BILATERAL LOW BACK PAIN WITHOUT SCIATICA: ICD-10-CM

## 2021-03-28 ENCOUNTER — VIRTUAL VISIT (OUTPATIENT)
Dept: FAMILY MEDICINE CLINIC | Age: 46
End: 2021-03-28
Payer: OTHER GOVERNMENT

## 2021-03-28 DIAGNOSIS — F17.200 TOBACCO DEPENDENCE: ICD-10-CM

## 2021-03-28 DIAGNOSIS — M51.9 LUMBAR DISC DISEASE: ICD-10-CM

## 2021-03-28 DIAGNOSIS — J45.20 MILD INTERMITTENT ASTHMA, UNSPECIFIED WHETHER COMPLICATED: Primary | ICD-10-CM

## 2021-03-28 DIAGNOSIS — I10 ESSENTIAL HYPERTENSION: ICD-10-CM

## 2021-03-28 PROCEDURE — 99442 PR PHYS/QHP TELEPHONE EVALUATION 11-20 MIN: CPT | Performed by: FAMILY MEDICINE

## 2021-03-28 RX ORDER — GABAPENTIN 300 MG/1
CAPSULE ORAL
Qty: 120 CAP | Refills: 2 | Status: SHIPPED | OUTPATIENT
Start: 2021-03-28 | End: 2021-07-15

## 2021-03-29 PROBLEM — M51.9 LUMBAR DISC DISEASE: Status: ACTIVE | Noted: 2021-03-29

## 2021-03-29 NOTE — PROGRESS NOTES
Gail Mckenzie is a 39 y.o. female, evaluated via audio-only technology on 3/28/2021 for No chief complaint on file. .    Assessment & Plan: Chronic back pain on Neurontin asthma, tobacco dependence, rheumatoid disease: The patient is seen with a telephone to telephone visit it was 15 minutes in nature. We needed to refill her Neurontin. She has been using that for several years. She has degenerative disc disease in the lumbar spine status post an ablation 2 years ago she has fatty deposits in her spine she uses Robaxin daily and meloxicam as well she is followed by her low back doctor. She tells me she has a history of fibromyalgia and she is followed at [facility] in the spinal center. She has had occasional injections in her trigger sites as well. We have refilled her medication. I have allowed her to ventilate regarding issues in her life. We had a significant discussion about tobacco cessation. She needs to be aggressive about that when she and says she is trying this was a 15-minute visit with face-to-face communication the patient was at her home I was in my office. 12  Subjective: Patient is a 47-year-old female with significant lumbar disc disease. She has had no nausea vomiting or diarrhea no rash no syncope or loss of consciousness. Her bowel movements have been appropriate no chest pain or shortness of breath she has been eating relatively well. Nobody at home has been sick she has chronic back pain needs Neurontin refilled she is followed by the [facility]. She tells me she has fibromyalgia she is still smoking at least a half a pack a day she uses Robaxin daily some meloxicam as well she has had no chest pain or shortness of breath she has some chronic anxiety with depression. Her bowel movements have been appropriate. No rash no syncope no loss of consciousness. Prior to Admission medications    Medication Sig Start Date End Date Taking?  Authorizing Provider gabapentin (NEURONTIN) 300 mg capsule TAKE 2 CAPSULES TWICE A DAY, MAXIMUM DAILY AMOUNT OF 1200 MG 3/28/21   Lila Palomares MD   meloxicam (MOBIC) 7.5 mg tablet Take 7.5 mg by mouth daily. 1-2 tabs daily as needed foor pain    Provider, Historical   famotidine (PEPCID) 20 mg tablet TAKE 1 TO 2 TABLETS AT BEDTIME AS NEEDED FOR ACID REFLUX  Indications: gastroesophageal reflux disease 2/2/21   Phill Szymanski NP   hydrOXYchloroQUINE (Plaquenil) 200 mg tablet Take 200 mg by mouth daily. 2 tabs daily    Provider, Historical   pantoprazole (PROTONIX) 40 mg tablet Take 1 Tab by mouth daily. 8/17/20   Yisel Castellanos MD   fluticasone propion-salmeteroL (Advair Diskus) 500-50 mcg/dose diskus inhaler Take 1 Puff by inhalation every twelve (12) hours. Provider, Historical   albuterol sulfate (PROVENTIL;VENTOLIN) 2.5 mg/0.5 mL nebu nebulizer solution by Nebulization route once. Provider, Historical   ALPRAZolam (XANAX) 1 mg tablet Take 1 mg by mouth. 1 and 1/2 tabs daily as needed    Provider, Historical   mecobalamin (B12 ACTIVE PO) Take  by mouth. Provider, Historical   baclofen (LIORESAL) 10 mg tablet Take  by mouth three (3) times daily. Provider, Historical   dextroamphetamine-amphetamine (ADDERALL) 30 mg tablet Take 30 mg by mouth daily. Provider, Historical   fexofenadine (ALLEGRA) 180 mg tablet Take 180 mg by mouth daily. Provider, Historical   fluticasone propionate (FLONASE) 50 mcg/actuation nasal spray 2 Sprays by Both Nostrils route daily. Provider, Historical   folic acid (FOLVITE) 1 mg tablet Take  by mouth daily. Provider, Historical   furosemide (LASIX) 40 mg tablet Take 40 mg by mouth daily. Provider, Historical   immun glob G,IgG,-pro-IgA 0-50 (Hizentra) 10 gram/50 mL (20 %) soln by SubCUTAneous route. Patient takes 10 grams every Monday    Provider, Historical   adalimumab (HUMIRA,CF, SC) by SubCUTAneous route.     Provider, Historical   lamoTRIgine (LaMICtal) 25 mg tablet Take 25 mg by mouth two (2) times a day. Provider, Historical   methocarbamoL (ROBAXIN) 500 mg tablet Take  by mouth four (4) times daily. Provider, Historical   methotrexate (RHEUMATREX) 2.5 mg tablet Take 10 mg by mouth every Sunday. Provider, Historical   metoprolol tartrate (LOPRESSOR) 25 mg tablet Take 25 mg by mouth two (2) times a day. Patient takes 1 and 1/2 once daily    Provider, Historical   montelukast (SINGULAIR) 10 mg tablet Take 10 mg by mouth daily. Provider, Historical   pyridoxine, vitamin B6, (VITAMIN B-6) 50 mg tablet Take  by mouth daily. Provider, Historical   ranolazine ER (RANEXA) 1,000 mg Take  by mouth two (2) times a day. Provider, Historical   risperiDONE (RisperDAL) 0.5 mg tablet Take 0.5 mg by mouth two (2) times a day. Provider, Historical   triazolam (HALCION) 0.25 mg tablet Take 0.25 mg by mouth nightly as needed. Provider, Historical   tenofovir ALAFENAMIDE FUMARATE (Vemlidy) 25 mg tab Take 25 mg by mouth daily. Provider, Historical     Patient Active Problem List   Diagnosis Code    Asthma J45.909    Bipolar disorder ([facility] F31.9    Obesity E66.9    Common variable agammaglobulinemia ([facility] D80.1    Dyspnea on exertion R06.00    Edema of foot R60.0    Essential hypertension I10    Gastroesophageal reflux disease K21.9    Hiatal hernia K44.9    Hyperlipidemia E78.5    Leukocytosis D72.829    Chronic bilateral low back pain without sciatica M54.5, G89.29    Sjogren's disease ([facility] M35.00    Laceration of right upper extremity S41.111A    Tobacco dependence F17.200    Lumbar disc disease M51.9       Review of Systems   Constitutional: Negative. HENT: Negative. Eyes: Negative. Respiratory: Negative. Cardiovascular: Negative. Gastrointestinal: Negative. Genitourinary: Negative. Musculoskeletal: Positive for back pain. Skin: Negative. Neurological: Negative. Endo/Heme/Allergies: Negative. Psychiatric/Behavioral: Positive for depression. No flowsheet data found. Marika Ramirez, who was evaluated through a patient-initiated, synchronous (real-time) audio only encounter, and/or her healthcare decision maker, is aware that it is a billable service, with coverage as determined by her insurance carrier. She provided verbal consent to proceed: n/a- consent obtained within past 12 months. She has not had a related appointment within my department in the past 7 days or scheduled within the next 24 hours.       Total Time: minutes: 11-20 minutes    Ariadne Guerra MD

## 2021-07-14 DIAGNOSIS — G89.29 CHRONIC BILATERAL LOW BACK PAIN WITHOUT SCIATICA: ICD-10-CM

## 2021-07-14 DIAGNOSIS — M54.50 CHRONIC BILATERAL LOW BACK PAIN WITHOUT SCIATICA: ICD-10-CM

## 2021-07-15 RX ORDER — GABAPENTIN 300 MG/1
CAPSULE ORAL
Qty: 120 CAPSULE | Refills: 2 | Status: SHIPPED | OUTPATIENT
Start: 2021-07-15 | End: 2021-10-12 | Stop reason: SDUPTHER

## 2021-10-03 DIAGNOSIS — G89.29 CHRONIC BILATERAL LOW BACK PAIN WITHOUT SCIATICA: ICD-10-CM

## 2021-10-03 DIAGNOSIS — M54.50 CHRONIC BILATERAL LOW BACK PAIN WITHOUT SCIATICA: ICD-10-CM

## 2021-10-04 RX ORDER — GABAPENTIN 300 MG/1
CAPSULE ORAL
Qty: 120 CAPSULE | Refills: 2 | OUTPATIENT
Start: 2021-10-04

## 2021-10-12 ENCOUNTER — OFFICE VISIT (OUTPATIENT)
Dept: FAMILY MEDICINE CLINIC | Age: 46
End: 2021-10-12
Payer: OTHER GOVERNMENT

## 2021-10-12 VITALS
HEART RATE: 75 BPM | BODY MASS INDEX: 36.65 KG/M2 | OXYGEN SATURATION: 100 % | WEIGHT: 234 LBS | SYSTOLIC BLOOD PRESSURE: 137 MMHG | DIASTOLIC BLOOD PRESSURE: 99 MMHG

## 2021-10-12 DIAGNOSIS — K21.9 GASTROESOPHAGEAL REFLUX DISEASE WITHOUT ESOPHAGITIS: Primary | ICD-10-CM

## 2021-10-12 DIAGNOSIS — J30.9 ALLERGIC RHINITIS, UNSPECIFIED SEASONALITY, UNSPECIFIED TRIGGER: ICD-10-CM

## 2021-10-12 DIAGNOSIS — E78.2 MIXED HYPERLIPIDEMIA: ICD-10-CM

## 2021-10-12 DIAGNOSIS — G89.29 CHRONIC BILATERAL LOW BACK PAIN WITHOUT SCIATICA: ICD-10-CM

## 2021-10-12 DIAGNOSIS — I10 ESSENTIAL HYPERTENSION: ICD-10-CM

## 2021-10-12 DIAGNOSIS — Z00.00 PREVENTATIVE HEALTH CARE: ICD-10-CM

## 2021-10-12 DIAGNOSIS — M54.50 CHRONIC BILATERAL LOW BACK PAIN WITHOUT SCIATICA: ICD-10-CM

## 2021-10-12 PROCEDURE — 99214 OFFICE O/P EST MOD 30 MIN: CPT | Performed by: STUDENT IN AN ORGANIZED HEALTH CARE EDUCATION/TRAINING PROGRAM

## 2021-10-12 PROCEDURE — 99396 PREV VISIT EST AGE 40-64: CPT | Performed by: STUDENT IN AN ORGANIZED HEALTH CARE EDUCATION/TRAINING PROGRAM

## 2021-10-12 RX ORDER — FLUTICASONE PROPIONATE 50 MCG
2 SPRAY, SUSPENSION (ML) NASAL DAILY
Qty: 1 EACH | Refills: 2 | Status: SHIPPED | OUTPATIENT
Start: 2021-10-12 | End: 2022-04-28 | Stop reason: SDUPTHER

## 2021-10-12 RX ORDER — FUROSEMIDE 40 MG/1
40 TABLET ORAL DAILY
Qty: 90 TABLET | Refills: 1 | Status: SHIPPED | OUTPATIENT
Start: 2021-10-12 | End: 2022-04-28 | Stop reason: SDUPTHER

## 2021-10-12 RX ORDER — METHOTREXATE 2.5 MG/1
10 TABLET ORAL
Status: CANCELLED | OUTPATIENT
Start: 2021-10-17

## 2021-10-12 RX ORDER — FLUTICASONE PROPIONATE AND SALMETEROL 500; 50 UG/1; UG/1
1 POWDER RESPIRATORY (INHALATION) EVERY 12 HOURS
Qty: 60 EACH | Refills: 2 | Status: SHIPPED | OUTPATIENT
Start: 2021-10-12 | End: 2022-04-28 | Stop reason: SDUPTHER

## 2021-10-12 RX ORDER — DEXTROAMPHETAMINE SACCHARATE, AMPHETAMINE ASPARTATE, DEXTROAMPHETAMINE SULFATE AND AMPHETAMINE SULFATE 7.5; 7.5; 7.5; 7.5 MG/1; MG/1; MG/1; MG/1
30 TABLET ORAL DAILY
Status: CANCELLED | OUTPATIENT
Start: 2021-10-12

## 2021-10-12 RX ORDER — TRIAZOLAM 0.25 MG/1
0.25 TABLET ORAL
Status: CANCELLED | OUTPATIENT
Start: 2021-10-12

## 2021-10-12 RX ORDER — NALTREXONE HYDROCHLORIDE 50 MG/1
50 TABLET, FILM COATED ORAL DAILY
COMMUNITY

## 2021-10-12 RX ORDER — LANOLIN ALCOHOL/MO/W.PET/CERES
50 CREAM (GRAM) TOPICAL DAILY
Qty: 90 TABLET | Refills: 2 | Status: SHIPPED | OUTPATIENT
Start: 2021-10-12 | End: 2022-04-28 | Stop reason: SDUPTHER

## 2021-10-12 RX ORDER — BACLOFEN 10 MG/1
10 TABLET ORAL 3 TIMES DAILY
Qty: 90 TABLET | Refills: 2 | Status: SHIPPED | OUTPATIENT
Start: 2021-10-12 | End: 2022-04-28 | Stop reason: SDUPTHER

## 2021-10-12 RX ORDER — TENOFOVIR ALAFENAMIDE 25 MG/1
25 TABLET ORAL DAILY
Status: CANCELLED | OUTPATIENT
Start: 2021-10-12

## 2021-10-12 RX ORDER — HYDROXYCHLOROQUINE SULFATE 200 MG/1
200 TABLET, FILM COATED ORAL DAILY
Status: CANCELLED | OUTPATIENT
Start: 2021-10-12

## 2021-10-12 RX ORDER — ALPRAZOLAM 1 MG/1
1 TABLET ORAL
Status: CANCELLED | OUTPATIENT
Start: 2021-10-12

## 2021-10-12 RX ORDER — PANTOPRAZOLE SODIUM 40 MG/1
40 TABLET, DELAYED RELEASE ORAL DAILY
Qty: 90 TABLET | Refills: 3 | Status: SHIPPED | OUTPATIENT
Start: 2021-10-12 | End: 2022-04-28

## 2021-10-12 RX ORDER — FAMOTIDINE 20 MG/1
TABLET, FILM COATED ORAL
Qty: 180 TABLET | Refills: 1 | Status: SHIPPED | OUTPATIENT
Start: 2021-10-12 | End: 2022-04-28 | Stop reason: SDUPTHER

## 2021-10-12 RX ORDER — MONTELUKAST SODIUM 10 MG/1
10 TABLET ORAL DAILY
Qty: 90 TABLET | Refills: 2 | Status: SHIPPED | OUTPATIENT
Start: 2021-10-12 | End: 2022-04-28 | Stop reason: SDUPTHER

## 2021-10-12 RX ORDER — MECOBALAMIN 1000 MCG
1 TABLET,CHEWABLE ORAL DAILY
Qty: 90 EACH | Refills: 2 | Status: SHIPPED | OUTPATIENT
Start: 2021-10-12 | End: 2022-04-28 | Stop reason: SDUPTHER

## 2021-10-12 RX ORDER — ALBUTEROL SULFATE 2.5 MG/.5ML
SOLUTION RESPIRATORY (INHALATION) ONCE
Status: CANCELLED | OUTPATIENT
Start: 2021-10-12 | End: 2021-10-12

## 2021-10-12 RX ORDER — OXYMETAZOLINE HCL 0.05 %
2 SPRAY, NON-AEROSOL (ML) NASAL 2 TIMES DAILY
Qty: 1 EACH | Refills: 0 | Status: SHIPPED | OUTPATIENT
Start: 2021-10-12 | End: 2021-10-15

## 2021-10-12 RX ORDER — METHOCARBAMOL 500 MG/1
500 TABLET, FILM COATED ORAL 4 TIMES DAILY
Qty: 180 TABLET | Refills: 1 | Status: SHIPPED | OUTPATIENT
Start: 2021-10-12 | End: 2022-04-28 | Stop reason: SDUPTHER

## 2021-10-12 RX ORDER — FOLIC ACID 1 MG/1
1 TABLET ORAL DAILY
Qty: 90 TABLET | Refills: 2 | Status: SHIPPED | OUTPATIENT
Start: 2021-10-12 | End: 2022-04-28 | Stop reason: SDUPTHER

## 2021-10-12 RX ORDER — METOPROLOL TARTRATE 25 MG/1
25 TABLET, FILM COATED ORAL 2 TIMES DAILY
Qty: 90 TABLET | Refills: 3 | Status: SHIPPED | OUTPATIENT
Start: 2021-10-12 | End: 2022-04-28

## 2021-10-12 RX ORDER — NALTREXONE HYDROCHLORIDE 50 MG/1
50 TABLET, FILM COATED ORAL DAILY
Status: CANCELLED | OUTPATIENT
Start: 2021-10-12

## 2021-10-12 RX ORDER — MINERAL OIL
180 ENEMA (ML) RECTAL DAILY
Qty: 90 TABLET | Refills: 2 | Status: SHIPPED | OUTPATIENT
Start: 2021-10-12 | End: 2022-04-28 | Stop reason: SDUPTHER

## 2021-10-12 RX ORDER — LAMOTRIGINE 25 MG/1
25 TABLET ORAL 2 TIMES DAILY
Status: CANCELLED | OUTPATIENT
Start: 2021-10-12

## 2021-10-12 RX ORDER — GABAPENTIN 300 MG/1
CAPSULE ORAL
Qty: 120 CAPSULE | Refills: 2 | Status: SHIPPED | OUTPATIENT
Start: 2021-10-12 | End: 2022-04-28 | Stop reason: SDUPTHER

## 2021-10-12 RX ORDER — RANOLAZINE 1000 MG/1
1000 TABLET, EXTENDED RELEASE ORAL 2 TIMES DAILY
Qty: 90 TABLET | Refills: 3 | Status: SHIPPED | OUTPATIENT
Start: 2021-10-12 | End: 2022-04-28 | Stop reason: SDUPTHER

## 2021-10-12 NOTE — PROGRESS NOTES
Marii Billings presents today for   Chief Complaint   Patient presents with   [facility] Establish Care     establish care with provider was a Mariam patient     Ear Pain     says both ears are draining down to her troat has been happening now for about 1 1/5 weeks        Is someone accompanying this pt? no    Is the patient using any DME equipment during OV? yes    Depression Screening:  3 most recent PHQ Screens 2/2/2021   Little interest or pleasure in doing things Not at all   Feeling down, depressed, irritable, or hopeless Not at all   Total Score PHQ 2 0       Learning Assessment:  Learning Assessment 12/17/2020   PRIMARY LEARNER Patient   HIGHEST LEVEL OF EDUCATION - PRIMARY LEARNER  -   BARRIERS PRIMARY LEARNER -   CO-LEARNER CAREGIVER -   PRIMARY LANGUAGE ENGLISH   LEARNER PREFERENCE PRIMARY DEMONSTRATION   ANSWERED BY patient   RELATIONSHIP SELF       Fall Risk  Fall Risk Assessment, last 12 mths 3/2/2021   Able to walk? Yes   Fall in past 12 months? 1   Do you feel unsteady? 1   Number of falls in past 12 months -   Fall with injury? -       ADL  ADL Assessment 11/3/2020   Feeding yourself No Help Needed   Getting from bed to chair No Help Needed   Getting dressed No Help Needed   Bathing or showering No Help Needed   Walk across the room (includes cane/walker) No Help Needed   Using the telphone No Help Needed   Taking your medications No Help Needed   Preparing meals No Help Needed   Managing money (expenses/bills) No Help Needed   Moderately strenuous housework (laundry) No Help Needed   Shopping for personal items (toiletries/medicines) No Help Needed   Shopping for groceries No Help Needed   Driving No Help Needed   Climbing a flight of stairs No Help Needed   Getting to places beyond walking distances No Help Needed       Travel Screening:    Travel Screening     Question   Response    In the last month, have you been in contact with someone who was confirmed or suspected to have Coronavirus / COVID-19? No / Unsure    Have you had a COVID-19 viral test in the last 14 days? No    Do you have any of the following new or worsening symptoms? None of these    Have you traveled internationally or domestically in the last month? Yes      Travel History   Travel since 09/12/21     No documented travel since 09/12/21          Health Maintenance reviewed and discussed and ordered per Provider. Health Maintenance Due   Topic Date Due    Hepatitis C Screening  Never done    Pneumococcal 0-64 years (1 of 4 - PCV13) Never done    COVID-19 Vaccine (1) Never done    Lipid Screen  Never done    Flu Vaccine (1) 09/01/2021    Breast Cancer Screen Mammogram  11/06/2021   . Coordination of Care:  1. \"Have you been to the ER, urgent care clinic since your last visit? Hospitalized since your last visit? \" No    2. \"Have you seen or consulted any other health care providers outside of the [address] [place] [address] since your last visit? \" Yes When: 10/12/2021     3. For patients aged 39-70: Has the patient had a colonoscopy? Yes, HM satisfied with blue hyperlink     If the patient is female:    4. For patients aged 41-77: Has the patient had a mammogram within the past 2 years? Yes, HM satisfied with blue hyperlink    5. For patients aged 21-65: Has the patient had a pap smear?  Yes, HM satisfied with blue hyperlink

## 2021-10-14 NOTE — PROGRESS NOTES
Subjective:   Jackson Smith is a 42 y.o. female who was seen for annual physical Establish Care (establish care with provider was a [facility] patient ) and Ear Pain (says both ears are draining down to her troat has been happening now for about 1 1/5 weeks )    She has been having worsening congestion, sinus pain and ear clogging for the last week. She has uncontrolled allergies and has been taking her allergy medicines as prescribed. Denies any fevers. Otherwise she has no other complaints. Doing well on all of her medications. Home Medications    Medication Sig Start Date End Date Taking? Authorizing Provider   naltrexone (DEPADE) 50 mg tablet Take 50 mg by mouth daily. Take 4.5 mg daily   Yes Provider, Historical   hydrOXYchloroQUINE (Plaquenil) 200 mg tablet Take 200 mg by mouth daily. 2 tabs daily   Yes Provider, Historical   albuterol sulfate (PROVENTIL;VENTOLIN) 2.5 mg/0.5 mL nebu nebulizer solution by Nebulization route once. Yes Provider, Historical   ALPRAZolam (XANAX) 1 mg tablet Take 1 mg by mouth. 1 and 1/2 tabs daily as needed   Yes Provider, Historical   dextroamphetamine-amphetamine (ADDERALL) 30 mg tablet Take 30 mg by mouth daily. Yes Provider, Historical   immun glob G,IgG,-pro-IgA 0-50 (Hizentra) 10 gram/50 mL (20 %) soln by SubCUTAneous route. Patient takes 10 grams every Monday   Yes Provider, Historical   adalimumab (HUMIRA,CF, SC) by SubCUTAneous route. Yes Provider, Historical   lamoTRIgine (LaMICtal) 25 mg tablet Take 25 mg by mouth two (2) times a day. Yes Provider, Historical   methotrexate (RHEUMATREX) 2.5 mg tablet Take 10 mg by mouth every Sunday. Yes Provider, Historical   triazolam (HALCION) 0.25 mg tablet Take 0.25 mg by mouth nightly as needed. Yes Provider, Historical   tenofovir ALAFENAMIDE FUMARATE (Vemlidy) 25 mg tab Take 25 mg by mouth daily. Yes Provider, Historical   folic acid (FOLVITE) 1 mg tablet Take 1 Tablet by mouth daily.  10/12/21  Yes Jacques Guerrero MD [facility]   pyridoxine, vitamin B6, (VITAMIN B-6) 50 mg tablet Take 1 Tablet by mouth daily. 10/12/21  Yes Daniela Rivera MD   furosemide (LASIX) 40 mg tablet Take 1 Tablet by mouth daily. 10/12/21  Yes Daniela Rivera MD   pantoprazole (PROTONIX) 40 mg tablet Take 1 Tablet by mouth daily. 10/12/21  Yes Daniela Rivera MD   methocarbamoL (ROBAXIN) 500 mg tablet Take 1 Tablet by mouth four (4) times daily. 10/12/21  Yes Daniela Rivera MD   baclofen (LIORESAL) 10 mg tablet Take 1 Tablet by mouth three (3) times daily. 10/12/21  Yes Daniela Rivera MD   metoprolol tartrate (LOPRESSOR) 25 mg tablet Take 1 Tablet by mouth two (2) times a day. Patient takes 1 and 1/2 once daily 10/12/21  Yes Daniela Rivera MD   ranolazine ER (RANEXA) 1,000 mg Take 1 Tablet by mouth two (2) times a day. 10/12/21  Yes Daniela Rivera MD   fluticasone propionate (FLONASE) 50 mcg/actuation nasal spray 2 Sprays by Both Nostrils route daily. 10/12/21  Yes Daniela Rivera MD   fexofenadine (ALLEGRA) 180 mg tablet Take 1 Tablet by mouth daily. 10/12/21  Yes Daniela Rivera MD   montelukast (SINGULAIR) 10 mg tablet Take 1 Tablet by mouth daily. 10/12/21  Yes Daniela Rivera MD   fluticasone propion-salmeteroL (Advair Diskus) 500-50 mcg/dose diskus inhaler Take 1 Puff by inhalation every twelve (12) hours. 10/12/21  Yes Daniela Rivera MD   famotidine (PEPCID) 20 mg tablet TAKE 1 TO 2 TABLETS AT BEDTIME AS NEEDED FOR ACID REFLUX  Indications: gastroesophageal reflux disease 10/12/21  Yes Daniela Rivera MD   gabapentin (NEURONTIN) 300 mg capsule TAKE 2 CAPSULES TWICE A DAY 10/12/21  Yes Daniela Rivera MD   mecobalamin, vitamin B12, (B12 Active) 1,000 mcg chew Take 1 Tablet by mouth daily. Take  by mouth. 10/12/21  Yes Daniela Rivera MD   oxymetazoline (AFRIN) 0.05 % nasal spray 2 Sprays by Both Nostrils route two (2) times a day for 3 days. 10/12/21 10/15/21 Yes Daniela Rivera MD   meloxicam (MOBIC) 7.5 mg tablet Take 7.5 mg by mouth daily.  1-2 tabs daily as needed foor pain  Patient not taking: Reported on 10/12/2021    Provider, Historical   risperiDONE (RisperDAL) 0.5 mg tablet Take 0.5 mg by mouth two (2) times a day. Patient not taking: Reported on 10/12/2021    Provider, Historical      Allergies   Allergen Reactions    Topiramate Other (comments)     Irregular heart rate      Tramadol Hives    Codeine Nausea Only    Diclofenac Swelling    Wellbutrin [Bupropion Hcl] Hives     Social History     Tobacco Use    Smoking status: Current Every Day Smoker     Packs/day: 0.50     Years: 15.00     Pack years: 7.50    Smokeless tobacco: Never Used   Vaping Use    Vaping Use: Never used   Substance Use Topics    Alcohol use: Not Currently    Drug use: Never            Review of Systems   HENT: Positive for postnasal drip and rhinorrhea. All other systems reviewed and are negative. Objective:     Visit Vitals  BP (!) 137/99   Pulse 75   Wt 234 lb (106.1 kg)   SpO2 100%   BMI 36.65 kg/m²        General: alert, oriented, not in distress  Head: scalp normal, atraumatic  Eyes: pupils are equal and reactive, full and intact EOM's  Ears: patent ear canal, intact tympanic membrane  Nose: normal turbinates, no congestion or discharge  Lips/Mouth: moist lips and buccal mucosa, non-enlarged tonsils, pink throat  Neck: supple, no JVD, no lymphadenopathy, non-palpable thyroid  Chest/Lungs: clear breath sounds, no wheezing or crackles  Heart: normal rate, regular rhythm, no murmur  Abdomen: soft, non-distended, non-tender, normal bowel sounds, no organomegaly, no masses  Extremities: no focal deformities, no edema  Skin: no active skin lesions    Laboratory/Tests:  No visits with results within 12 Month(s) from this visit. Latest known visit with results is:   No results found for any previous visit. Assessment & Plan:     1. Gastroesophageal reflux disease without esophagitis  Continue protonix and pepcid  - pantoprazole (PROTONIX) 40 mg tablet;  Take 1 Tablet by mouth daily. Dispense: 90 Tablet; Refill: 3  - famotidine (PEPCID) 20 mg tablet; TAKE 1 TO 2 TABLETS AT BEDTIME AS NEEDED FOR ACID REFLUX  Indications: gastroesophageal reflux disease  Dispense: 180 Tablet; Refill: 1    2. Chronic bilateral low back pain without sciatica  Continue gabapentin  - gabapentin (NEURONTIN) 300 mg capsule; TAKE 2 CAPSULES TWICE A DAY  Dispense: 120 Capsule; Refill: 2    3. Essential hypertension  Controlled. Continue lasix, metoprolol    4. Mixed hyperlipidemia  Check lipid panel    5. Allergic rhinitis, unspecified seasonality, unspecified trigger  Start afrin for 3 days. Continue flonase and allegra    6. Preventative health care  Labs ordered for today. Annual physical done  - CBC WITH AUTOMATED DIFF  - METABOLIC PANEL, COMPREHENSIVE  - HEMOGLOBIN A1C W/O EAG  - LIPID PANEL  - MICROALBUMIN, UR, RAND W/ MICROALB/CREAT RATIO             I have discussed the diagnosis with the patient and the intended plan as seen in the above orders. The patient has received an after-visit summary and questions were answered concerning future plans. I have discussed medication side effects and warnings with the patient as well. I have reviewed the plan of care with the patient, accepted their input and they are in agreement with the treatment goals. Previous lab and imaging results were reviewed by me.        Cleo Croft MD  October 14, 2021

## 2022-03-30 ENCOUNTER — TELEPHONE (OUTPATIENT)
Dept: FAMILY MEDICINE CLINIC | Age: 47
End: 2022-03-30

## 2022-03-30 NOTE — TELEPHONE ENCOUNTER
Patient called requesting new referral for [facility] Dr. Mable Sawyer for her immune disorder and asthma

## 2022-04-18 ENCOUNTER — TELEPHONE (OUTPATIENT)
Dept: FAMILY MEDICINE CLINIC | Age: 47
End: 2022-04-18

## 2022-04-28 ENCOUNTER — VIRTUAL VISIT (OUTPATIENT)
Dept: FAMILY MEDICINE CLINIC | Age: 47
End: 2022-04-28
Payer: OTHER GOVERNMENT

## 2022-04-28 DIAGNOSIS — M54.50 CHRONIC BILATERAL LOW BACK PAIN WITHOUT SCIATICA: ICD-10-CM

## 2022-04-28 DIAGNOSIS — G89.29 CHRONIC BILATERAL LOW BACK PAIN WITHOUT SCIATICA: ICD-10-CM

## 2022-04-28 DIAGNOSIS — K21.9 GASTROESOPHAGEAL REFLUX DISEASE WITHOUT ESOPHAGITIS: ICD-10-CM

## 2022-04-28 DIAGNOSIS — M35.00 SJOGREN'S SYNDROME, WITH UNSPECIFIED ORGAN INVOLVEMENT (HCC): Primary | ICD-10-CM

## 2022-04-28 PROCEDURE — 99442 PR PHYS/QHP TELEPHONE EVALUATION 11-20 MIN: CPT | Performed by: STUDENT IN AN ORGANIZED HEALTH CARE EDUCATION/TRAINING PROGRAM

## 2022-04-28 RX ORDER — FUROSEMIDE 40 MG/1
40 TABLET ORAL DAILY
Qty: 60 TABLET | Refills: 0 | Status: SHIPPED | OUTPATIENT
Start: 2022-04-28 | End: 2022-07-08

## 2022-04-28 RX ORDER — RANOLAZINE 1000 MG/1
1000 TABLET, EXTENDED RELEASE ORAL 2 TIMES DAILY
Qty: 120 TABLET | Refills: 0 | Status: SHIPPED | OUTPATIENT
Start: 2022-04-28 | End: 2022-07-08

## 2022-04-28 RX ORDER — MONTELUKAST SODIUM 10 MG/1
10 TABLET ORAL DAILY
Qty: 90 TABLET | Refills: 2 | Status: SHIPPED | OUTPATIENT
Start: 2022-04-28

## 2022-04-28 RX ORDER — FOLIC ACID 1 MG/1
1 TABLET ORAL DAILY
Qty: 60 TABLET | Refills: 0 | Status: SHIPPED | OUTPATIENT
Start: 2022-04-28 | End: 2022-07-08

## 2022-04-28 RX ORDER — FLUTICASONE PROPIONATE 50 MCG
2 SPRAY, SUSPENSION (ML) NASAL DAILY
Qty: 1 EACH | Refills: 2 | Status: SHIPPED | OUTPATIENT
Start: 2022-04-28

## 2022-04-28 RX ORDER — MINERAL OIL
180 ENEMA (ML) RECTAL DAILY
Qty: 60 TABLET | Refills: 0 | Status: SHIPPED | OUTPATIENT
Start: 2022-04-28

## 2022-04-28 RX ORDER — FAMOTIDINE 20 MG/1
TABLET, FILM COATED ORAL
Qty: 60 TABLET | Refills: 0 | Status: SHIPPED | OUTPATIENT
Start: 2022-04-28 | End: 2022-07-08

## 2022-04-28 RX ORDER — FLUTICASONE PROPIONATE AND SALMETEROL 500; 50 UG/1; UG/1
1 POWDER RESPIRATORY (INHALATION) EVERY 12 HOURS
Qty: 60 EACH | Refills: 1 | Status: SHIPPED | OUTPATIENT
Start: 2022-04-28

## 2022-04-28 RX ORDER — MECOBALAMIN 1000 MCG
1 TABLET,CHEWABLE ORAL DAILY
Qty: 60 EACH | Refills: 0 | Status: SHIPPED | OUTPATIENT
Start: 2022-04-28

## 2022-04-28 RX ORDER — METOPROLOL SUCCINATE 25 MG/1
37.5 TABLET, EXTENDED RELEASE ORAL DAILY
Qty: 90 TABLET | Refills: 0 | Status: SHIPPED | OUTPATIENT
Start: 2022-04-28 | End: 2022-07-08

## 2022-04-28 RX ORDER — BACLOFEN 10 MG/1
10 TABLET ORAL 3 TIMES DAILY
Qty: 180 TABLET | Refills: 0 | Status: SHIPPED | OUTPATIENT
Start: 2022-04-28

## 2022-04-28 RX ORDER — METHOCARBAMOL 500 MG/1
500 TABLET, FILM COATED ORAL 4 TIMES DAILY
Qty: 240 TABLET | Refills: 0 | Status: SHIPPED | OUTPATIENT
Start: 2022-04-28 | End: 2022-07-08

## 2022-04-28 RX ORDER — GABAPENTIN 300 MG/1
CAPSULE ORAL
Qty: 120 CAPSULE | Refills: 1 | Status: SHIPPED | OUTPATIENT
Start: 2022-04-28 | End: 2022-07-08

## 2022-04-28 RX ORDER — HYDROXYCHLOROQUINE SULFATE 200 MG/1
400 TABLET, FILM COATED ORAL DAILY
Qty: 120 TABLET | Refills: 0 | Status: SHIPPED | OUTPATIENT
Start: 2022-04-28 | End: 2022-07-08

## 2022-04-28 RX ORDER — LANOLIN ALCOHOL/MO/W.PET/CERES
50 CREAM (GRAM) TOPICAL DAILY
Qty: 60 TABLET | Refills: 0 | Status: SHIPPED | OUTPATIENT
Start: 2022-04-28

## 2022-04-28 NOTE — PROGRESS NOTES
Lena Swift is a 42 y.o. female who was seen by synchronous (real-time) audio technology on 4/28/2022. Consent: Lena Swift, who was seen by synchronous (real-time) audio technology, and/or her healthcare decision maker, is aware that this patient-initiated, Telehealth encounter on 4/28/2022 is a billable service, with coverage as determined by her insurance carrier. She is aware that she may receive a bill and has provided verbal consent to proceed: Yes. Subjective:   Lena Swift is a 42 y.o. female who was seen for medication refills. She is moving to [place] and wants all of her medications refilled for 2 months. She will establish with a new physician there. She would like me to prescribe the hydroxychloroquine for the next 2 months. She has no other complaints. Home Medications    Medication Sig Start Date End Date Taking? Authorizing Provider   naltrexone (DEPADE) 50 mg tablet Take 50 mg by mouth daily. Take 4.5 mg daily    Provider, Arvin   folic acid (FOLVITE) 1 mg tablet Take 1 Tablet by mouth daily. 10/12/21   Kristopher Mcdonald MD   pyridoxine, vitamin B6, (VITAMIN B-6) 50 mg tablet Take 1 Tablet by mouth daily. 10/12/21   Kristopher Mcdonald MD   furosemide (LASIX) 40 mg tablet Take 1 Tablet by mouth daily. 10/12/21   Kristopher Mcdonald MD   pantoprazole (PROTONIX) 40 mg tablet Take 1 Tablet by mouth daily. 10/12/21   Kristopher Mcdonald MD   methocarbamoL (ROBAXIN) 500 mg tablet Take 1 Tablet by mouth four (4) times daily. 10/12/21   Kristopher Mcdonald MD   baclofen (LIORESAL) 10 mg tablet Take 1 Tablet by mouth three (3) times daily. 10/12/21   Kristopher Mcdonald MD   metoprolol tartrate (LOPRESSOR) 25 mg tablet Take 1 Tablet by mouth two (2) times a day. Patient takes 1 and 1/2 once daily 10/12/21   Kristopher Mcdonald MD   ranolazine ER (RANEXA) 1,000 mg Take 1 Tablet by mouth two (2) times a day.  10/12/21   Kristopher Mcdonald MD   fluticasone propionate (FLONASE) 50 mcg/actuation nasal spray 2 Sprays by Both Nostrils route daily. 10/12/21   Mike Estrada MD   fexofenadine (ALLEGRA) 180 mg tablet Take 1 Tablet by mouth daily. 10/12/21   Mike Estrada MD   montelukast (SINGULAIR) 10 mg tablet Take 1 Tablet by mouth daily. 10/12/21   Mike Estrada MD   fluticasone propion-salmeteroL (Advair Diskus) 500-50 mcg/dose diskus inhaler Take 1 Puff by inhalation every twelve (12) hours. 10/12/21   Mike Estrada MD   famotidine (PEPCID) 20 mg tablet TAKE 1 TO 2 TABLETS AT BEDTIME AS NEEDED FOR ACID REFLUX  Indications: gastroesophageal reflux disease 10/12/21   Mike Estrada MD   gabapentin (NEURONTIN) 300 mg capsule TAKE 2 CAPSULES TWICE A DAY 10/12/21   Mike Estrada MD   mecobalamin, vitamin B12, (B12 Active) 1,000 mcg chew Take 1 Tablet by mouth daily. Take  by mouth. 10/12/21   Mike Estrada MD   meloxicam (MOBIC) 7.5 mg tablet Take 7.5 mg by mouth daily. 1-2 tabs daily as needed foor pain  Patient not taking: Reported on 10/12/2021    Provider, Historical   hydrOXYchloroQUINE (Plaquenil) 200 mg tablet Take 200 mg by mouth daily. 2 tabs daily    Provider, Historical   albuterol sulfate (PROVENTIL;VENTOLIN) 2.5 mg/0.5 mL nebu nebulizer solution by Nebulization route once. Provider, Historical   ALPRAZolam (XANAX) 1 mg tablet Take 1 mg by mouth. 1 and 1/2 tabs daily as needed    Provider, Historical   dextroamphetamine-amphetamine (ADDERALL) 30 mg tablet Take 30 mg by mouth daily. Provider, Historical   immun glob G,IgG,-pro-IgA 0-50 (Hizentra) 10 gram/50 mL (20 %) soln by SubCUTAneous route. Patient takes 10 grams every Monday    Provider, Historical   adalimumab (HUMIRA,CF, SC) by SubCUTAneous route. Provider, Historical   lamoTRIgine (LaMICtal) 25 mg tablet Take 25 mg by mouth two (2) times a day. Provider, Historical   methotrexate (RHEUMATREX) 2.5 mg tablet Take 10 mg by mouth every Sunday. Provider, Historical   risperiDONE (RisperDAL) 0.5 mg tablet Take 0.5 mg by mouth two (2) times a day.   Patient not taking: Reported on 10/12/2021    Provider, Historical   triazolam (HALCION) 0.25 mg tablet Take 0.25 mg by mouth nightly as needed. Provider, Historical   tenofovir ALAFENAMIDE FUMARATE (Vemlidy) 25 mg tab Take 25 mg by mouth daily. Provider, Historical      Allergies   Allergen Reactions    Topiramate Other (comments)     Irregular heart rate      Tramadol Hives    Codeine Nausea Only    Diclofenac Swelling    Wellbutrin [Bupropion Hcl] Hives     Social History     Tobacco Use    Smoking status: Current Every Day Smoker     Packs/day: 0.50     Years: 15.00     Pack years: 7.50    Smokeless tobacco: Never Used   Vaping Use    Vaping Use: Never used   Substance Use Topics    Alcohol use: Not Currently    Drug use: Never        Review of systems:  All other systems are negative          Objective: There were no vitals taken for this visit. General: alert, cooperative, no distress   Mental  status: normal mood, behavior, speech, thought processes, able to follow commands   Psychiatric: normal affect, consistent with stated mood, no evidence of hallucinations       Assessment & Plan:     1. Sjogren's syndrome, with unspecified organ involvement ([facility]  Refill hydroxychloroquine for 2 months. She will establish with rheumatology in [place]    2. Gastroesophageal reflux disease without esophagitis  - famotidine (PEPCID) 20 mg tablet; TAKE 1 TO 2 TABLETS AT BEDTIME AS NEEDED FOR ACID REFLUX  Indications: gastroesophageal reflux disease  Dispense: 60 Tablet; Refill: 0    3. Chronic bilateral low back pain without sciatica  - gabapentin (NEURONTIN) 300 mg capsule; TAKE 2 CAPSULES TWICE A DAY  Dispense: 120 Capsule; Refill: 1        Time spent on encounter: 15 minutes  712      We discussed the expected course, resolution and complications of the diagnosis(es) in detail. Medication risks, benefits, costs, interactions, and alternatives were discussed as indicated.   I advised her to contact the office if her condition worsens, changes or fails to improve as anticipated. She expressed understanding with the diagnosis(es) and plan. Isaac Dumont is a 42 y.o. female who was evaluated by an audio visit encounter for concerns as above. Patient identification was verified prior to start of the visit. A caregiver was present when appropriate. Due to this being a TeleHealth encounter (During ZEG-01 public health emergency), evaluation of the following organ systems was limited: Vitals/Constitutional/EENT/Resp/CV/GI//MS/Neuro/Skin/Heme-Lymph-Imm. Pursuant to the emergency declaration under the [facility], [facility] waiver authority and the [employer] and [employer] Act, this Virtual  Visit was conducted, with patient's (and/or legal guardian's) consent, to reduce the patient's risk of exposure to COVID-19 and provide necessary medical care. Isaac Dumont is a 42 y.o. female who was seen by synchronous (real-time) audio technology on 4/28/2022. Consent: Isaac Dumont, who was seen by synchronous (real-time) audio technology, and/or her healthcare decision maker, is aware that this patient-initiated, Telehealth encounter on 4/28/2022 is a billable service, with coverage as determined by her insurance carrier. She is aware that she may receive a bill and has provided verbal consent to proceed: Yes. Ability to conduct physical exam was limited. I was in the office. The patient was at home.